# Patient Record
Sex: FEMALE | Race: WHITE | Employment: UNEMPLOYED | URBAN - METROPOLITAN AREA
[De-identification: names, ages, dates, MRNs, and addresses within clinical notes are randomized per-mention and may not be internally consistent; named-entity substitution may affect disease eponyms.]

---

## 2017-03-08 ENCOUNTER — OFFICE VISIT (OUTPATIENT)
Dept: OPHTHALMOLOGY | Facility: CLINIC | Age: 67
End: 2017-03-08
Attending: OPHTHALMOLOGY
Payer: COMMERCIAL

## 2017-03-08 DIAGNOSIS — H40.1132 PRIMARY OPEN ANGLE GLAUCOMA OF BOTH EYES, MODERATE STAGE: ICD-10-CM

## 2017-03-08 PROCEDURE — 99213 OFFICE O/P EST LOW 20 MIN: CPT | Mod: 25,ZF

## 2017-03-08 PROCEDURE — 92083 EXTENDED VISUAL FIELD XM: CPT | Mod: ZF | Performed by: OPHTHALMOLOGY

## 2017-03-08 PROCEDURE — 92133 CPTRZD OPH DX IMG PST SGM ON: CPT | Mod: ZF | Performed by: OPHTHALMOLOGY

## 2017-03-08 RX ORDER — CARBOXYMETHYLCELLULOSE SODIUM 5 MG/ML
1 SOLUTION/ DROPS OPHTHALMIC AT BEDTIME
COMMUNITY

## 2017-03-08 ASSESSMENT — CONF VISUAL FIELD
OD_INFERIOR_NASAL_RESTRICTION: 3
OS_NORMAL: 1

## 2017-03-08 ASSESSMENT — VISUAL ACUITY
OS_CC: 20/20
OS_CC+: -3
METHOD: SNELLEN - LINEAR
OD_CC+: +1
CORRECTION_TYPE: GLASSES
OD_CC: 20/25

## 2017-03-08 ASSESSMENT — REFRACTION_WEARINGRX
OD_SPHERE: -5.50
OS_SPHERE: -6.00
OD_ADD: +2.75
OS_AXIS: 084
OS_ADD: +2.75
OD_CYLINDER: +1.25
SPECS_TYPE: PAL
OS_CYLINDER: +2.00
OD_AXIS: 078

## 2017-03-08 ASSESSMENT — CUP TO DISC RATIO
OS_RATIO: 0.8
OD_RATIO: 0.85

## 2017-03-08 ASSESSMENT — TONOMETRY
OD_IOP_MMHG: 24
IOP_METHOD: APPLANATION
OS_IOP_MMHG: 07

## 2017-03-08 ASSESSMENT — SLIT LAMP EXAM - LIDS
COMMENTS: NORMAL
COMMENTS: NORMAL

## 2017-03-08 NOTE — MR AVS SNAPSHOT
After Visit Summary   3/8/2017    Anahy Villegas    MRN: [id number]           Patient Information     Date Of Birth          1950        Visit Information        Provider Department      3/8/2017 9:45 AM Annamarie Cade MD [facility]        Today's Diagnoses     Primary open angle glaucoma of both eyes, moderate stage           Follow-ups after your visit        Follow-up notes from your care team     Return in about 2 months (around 5/8/2017) for visual field 24-2 od only.      Your next 10 appointments already scheduled     May 17, 2017 12:00 PM CDT   VISUAL FIELD with [facility] EYE VISUAL FIELD   Eye Clinic ([facility])    [address]  [address]   [address] [address]  [facility] [id number] [phone]            May 17, 2017 12:30 PM CDT   RETURN GLAUCOMA with Annamarie Cade MD   Eye Clinic ([facility])    [address]  [address]   [address] [address] [place] [id number] [phone]              Who to contact     Please call your clinic at [phone] to:    Ask questions about your health    Make or cancel appointments    Discuss your medicines    Learn about your test results    Speak to your doctor   If you have compliments or concerns about an experience at your clinic, or if you wish to file a complaint, please contact [facility] Patient Relations at [phone] or email us at [email or web address]         Additional Information About Your Visit        MyChart Information     "[employer]hart gives you secure access to your electronic health record. If you see a primary care provider, you can also send messages to your care team and make appointments. If you have questions, please call your primary care clinic.  If you do not have a primary care provider, please call [phone] and they will assist you.      Andre Phillipe is an electronic gateway that provides easy, online access to your medical records. With  Freddy, you can request a clinic appointment, read your test results, renew a prescription or communicate with your care team.     To access your existing account, please contact your [facility] or call [phone] for assistance.        Care EveryWhere ID     This is your Care EveryWhere ID. This could be used by other organizations to access your [place] medical records  [id number]         Blood Pressure from Last 3 Encounters:   08/29/16 (!) 160/95   03/07/16 127/81   08/21/15 141/87    Weight from Last 3 Encounters:   08/29/16 83.9 kg (185 lb)   03/07/16 83 kg (183 lb)   08/21/15 82.6 kg (182 lb)              We Performed the Following     OCT Optic Nerve RNFL Spectralis OU (both eyes)     OVF 24-2 Dynamic OD          Today's Medication Changes          These changes are accurate as of: 3/8/17 12:00 PM.  If you have any questions, ask your nurse or doctor.               Stop taking these medicines if you haven't already. Please contact your care team if you have questions.     meloxicam 15 MG tablet   Commonly known as:  MOBIC           prednisoLONE acetate 1 % ophthalmic susp   Commonly known as:  PRED FORTE           priLOSEC OTC 20 MG tablet   Generic drug:  omeprazole                    Primary Care Provider Office Phone # Fax #    Gregg Chu [phone]       [address] [address]        Thank you!     Thank you for choosing EYE CLINIC  for your care. Our goal is always to provide you with excellent care. Hearing back from our patients is one way we can continue to improve our services. Please take a few minutes to complete the written survey that you may receive in the mail after your visit with us. Thank you!             Your Updated Medication List - Protect others around you: Learn how to safely use, store and throw away your medicines at www.disposemymeds.org.          This list is accurate as of:  3/8/17 12:00 PM.  Always use your most recent med list.                   Brand Name Dispense Instructions for use    albuterol 108 (90 BASE) MCG/ACT Inhaler    PROAIR HFA/PROVENTIL HFA/VENTOLIN HFA    1 Inhaler    Inhale 2 puffs into the lungs every 6 hours as needed for shortness of breath / dyspnea or wheezing       ALEVE 220 MG capsule   Generic drug:  naproxen sodium      Take 220 mg by mouth as needed       azithromycin 250 MG tablet    ZITHROMAX    6 tablet    Two tablets first day, then one tablet daily for four days.       carboxymethylcellulose 0.5 % Soln ophthalmic solution    REFRESH PLUS     Place 1 drop into both eyes At Bedtime       CENTRUM SILVER per tablet      Take 1 tablet by mouth daily.       cholecalciferol 1000 UNIT tablet    vitamin D     Take 1 tablet by mouth daily.       EPINEPHrine 0.3 MG/0.3ML injection     1 each    Inject 0.3 mLs (0.3 mg) into the muscle once as needed for anaphylaxis       esomeprazole 20 MG CR capsule    nexIUM     Take 20 mg by mouth every morning (before breakfast) Take 30-60 minutes before eating.       fish oil-omega-3 fatty acids 1000 MG capsule      Take 1 g by mouth daily.       hydrocortisone 2.5 % cream     30 g    Apply topically 2 times daily For 1-2 weeks       * Tafluprost 0.0015 % Soln    ZIOPTAN    30 each    Place 1 drop into the right eye At Bedtime       * Tafluprost 0.0015 % Soln     30 mL    Place 1 drop into the right eye At Bedtime       * Notice:  This list has 2 medication(s) that are the same as other medications prescribed for you. Read the directions carefully, and ask your doctor or other care provider to review them with you.

## 2017-03-08 NOTE — PROGRESS NOTES
Primary open angle glaucoma (POAG)  moderate stage in both eyes  Trabeculectomy mitomycin-C left eye 8/21/15    Not sure that right eye is stable    Family history: Positive    Current Ophthalmic Rx:  Zioptan at bedtime right eye   Intolerant to many drops (alphagan, azopt, non-preserved cosopt, lumigan)  Artificial tears as needed both eyes     Prior Ophthalmic Surgeries:  Selected laser trabeculoplasty (SLT) right eye done 7-14-14  Trabeculectomy mitomycin-C left eye 8/21/15    24-2 Visual field:  Nasal step right eye, has been variable in the past      Assessment:    Moderate Primary open angle glaucoma (POAG)  both eyes     Prefers aggressive treatment given family history   Prior OCT retinal nerve fiber layer continued downward trend, but no change for past year right eye, slight worsening left eye may have occurred before  trabeculectomy left eye    Visual fields show nasal step right eye, has been present past several years. IOP slightly above goal, on steroid inhaler past week and a half and previous  steroid response.    Trabeculectomy mitomycin-C left eye (8-21-15) doing well  No medications Left eye    Plan:   Return to clinic 2 months for repeat 24-2 right eye, if confirmed worse consider surgery  Previously intolerant of many drops, may need trabeculectomy right eye     Attending Physician Attestation:  Complete documentation of historical and exam elements from today's encounter can be found in the full encounter summary report (not reduplicated in this progress note). I personally obtained the chief complaint(s) and history of present illness. I confirmed and edited asnecessary the review of systems, past medical/surgical history, family history, social history, and examination findings as documented by others; and I examined the patient myself. I personally reviewed the relevant tests, images, and reports as documented above. I formulated and edited as necessary the assessment and plan and discussed  the findings and management plan with the patient and family.  - Annamarie Cade MD 11:32 AM 3/8/2017

## 2017-05-01 ENCOUNTER — HOSPITAL ENCOUNTER (OUTPATIENT)
Dept: PHYSICAL THERAPY | Facility: CLINIC | Age: 67
Setting detail: THERAPIES SERIES
End: 2017-05-01
Attending: ORTHOPAEDIC SURGERY
Payer: COMMERCIAL

## 2017-05-01 PROCEDURE — 97110 THERAPEUTIC EXERCISES: CPT | Mod: GP | Performed by: PHYSICAL THERAPIST

## 2017-05-01 PROCEDURE — 97140 MANUAL THERAPY 1/> REGIONS: CPT | Mod: GP | Performed by: PHYSICAL THERAPIST

## 2017-05-01 PROCEDURE — 97162 PT EVAL MOD COMPLEX 30 MIN: CPT | Mod: GP | Performed by: PHYSICAL THERAPIST

## 2017-05-01 PROCEDURE — 40000718 ZZHC STATISTIC PT DEPARTMENT ORTHO VISIT: Performed by: PHYSICAL THERAPIST

## 2017-05-03 NOTE — PROGRESS NOTES
05/01/17 1000   General Information   Type of Visit Initial OP Ortho PT Evaluation   Start of Care Date 05/01/17   Referring Physician Gregg Lopez MD - [facility]   Patient/Family Goals Statement goal is to leran HEP    Orders Evaluate and Treat   Date of Order 03/22/17   Insurance Type Other   Insurance Comments/Visits Authorized PO - 365   Medical Diagnosis L knee OA and low back pain    Surgical/Medical history reviewed Yes   Precautions/Limitations no known precautions/limitations   Body Part(s)   Body Part(s) Lumbar Spine/SI;Knee   Presentation and Etiology   Pertinent history of current problem (include personal factors and/or comorbidities that impact the POC) Pt reports initially having R knee weakness since a teen due to surgery thus the L knee has overworked. Pt relates L knee pain has increased in the last couple of months, possibly due to increased stairs/lifting at [employer]. Pt  had an injection in her L knee at the beginning of April which was helpful. Pt biggest c/o with knee is stairs and she also has possibly torn meniscus.  Pt also reports chronic LBP w a hx of injections. Pt reports she takes OTC meds. Pt reports she saw a chiro and she thought it was sciatica. LBP is the worst with sleeping and has a lot of pain over L SI joint. PMH: broken tail bone, L knee arthroscopy, arthritis,    Impairments A. Pain   Functional Limitations perform activities of daily living;perform required work activities;perform desired leisure / sports activities   Symptom Location L knee and LBP    How/Where did it occur From insidious onset   Onset date of current episode/exacerbation 04/03/17   Chronicity Other   Pain rating (0-10 point scale) Best (/10);Worst (/10)   Best (/10) 0   Worst (/10) 5   Pain quality C. Aching   Frequency of pain/symptoms B. Intermittent   Pain/symptoms exacerbated by C. Lifting;D. Carrying;M. Other   Pain/symptoms eased by D. Nothing   Progression of symptoms since onset:  Worsened   Current Level of Function   Current Community Support Family/friend caregiver   Patient role/employment history A. Employed   Employment Comments causal RN in [place]   Living environment [place]   Fall Risk Screen   Fall screen completed by PT   Per patient - Fall 2 or more times in past year? No   Per patient - Fall with injury in past year? No   Is patient a fall risk? No   Knee Objective Findings   Side (if bilateral, select both right and left) Left   Integumentary  min swelling   Posture squat -fair form  min pain on R    Lachmans Test pos    Anterior Drawer Test neg   Posterior Drawer Test neg   Varus Stress Test neg   Valgus Stress Test neg   Left Knee Extension AROM 0   Left Knee Flexion AROM 130   Lumbar Spine/SI Objective Findings   Balance/Proprioception (Single Leg Stance) R: 5 secs L: 5 secs w mod sway   Flexion ROM to floor    Extension %    Right Side Bending %   Left Side Bending % w pain on L    Pelvic Screen pos stork on L, R post innominate rot    Hip Flexion (L2) Strength R:4/5  L: 5/5     Hip Abduction Strength L: 3/5     Knee Flexion Strength 5/5   Knee Extension (L3) Strength R: 3/5, L: 5/5   Ankle Dorsiflexion (L4) Strength 5/5   Great Toe Extension (L5) Strength 5/5   Planned Therapy Interventions   Planned Therapy Interventions balance training;gait training;joint mobilization;manual therapy;ROM;neuromuscular re-education;strengthening;stretching   Planned Modality Interventions   Planned Modality Interventions Comments as needed   Clinical Impression   Criteria for Skilled Therapeutic Interventions Met yes, treatment indicated   PT Diagnosis L knee pain and LBP    Influenced by the following impairments decreased strength, ROM, pain   Functional limitations due to impairments balance, walking, stairs,    Clinical Presentation Evolving/Changing   Clinical Presentation Rationale +motivation - PMH, chornic pain, nature of job   Clinical Decision Making  (Complexity) Moderate complexity   Therapy Frequency 1 time/week   Predicted Duration of Therapy Intervention (days/wks) 8 weeks   Risk & Benefits of therapy have been explained Yes   Patient, Family & other staff in agreement with plan of care Yes   Education Assessment   Preferred Learning Style Reading;Listening;Demonstration;Pictures/video   Barriers to Learning No barriers   ORTHO GOALS   PT Ortho Eval Goals 1;2;3;4   Ortho Goal 1   Goal Identifier sleep   Goal Description Pt will report waking up less than 2x/night due to pain in order to get a restful night's sleep   Target Date 05/22/17   Ortho Goal 2   Goal Identifier ABDULLAHI   Goal Description Pt will report <10% disability on ABDULLAHI to demonstrate improved ability to complete daily activities and demonstrate significant clinical improvement   Target Date 06/12/17   Ortho Goal 3   Goal Identifier return to exercise class   Goal Description Pt will demonstrate 5/5 LE strength w less than 1/10 pain in order to allow her to return to exercise class   Target Date 05/22/17   Ortho Goal 4   Goal Identifier stairs   Goal Description Pt will demonstrates ascending/descending stair w reciprocal gait pattern and report less than 1/10 pain in order to return to PLOF.   Target Date 06/12/17   Total Evaluation Time   Total Evaluation Time 55(25 eval, 1 MT, 1 TE)      Andreina Cruz  [place] [address]  [email or web address]   [email or web address]   Office: [phone] Fax: [phone]

## 2017-05-08 ENCOUNTER — HOSPITAL ENCOUNTER (OUTPATIENT)
Dept: PHYSICAL THERAPY | Facility: CLINIC | Age: 67
Setting detail: THERAPIES SERIES
End: 2017-05-08
Attending: ORTHOPAEDIC SURGERY
Payer: COMMERCIAL

## 2017-05-08 PROCEDURE — 97110 THERAPEUTIC EXERCISES: CPT | Mod: GP | Performed by: PHYSICAL THERAPIST

## 2017-05-08 PROCEDURE — 40000718 ZZHC STATISTIC PT DEPARTMENT ORTHO VISIT: Performed by: PHYSICAL THERAPIST

## 2017-05-15 ENCOUNTER — HOSPITAL ENCOUNTER (OUTPATIENT)
Dept: PHYSICAL THERAPY | Facility: CLINIC | Age: 67
Setting detail: THERAPIES SERIES
End: 2017-05-15
Attending: ORTHOPAEDIC SURGERY
Payer: COMMERCIAL

## 2017-05-15 PROCEDURE — 40000718 ZZHC STATISTIC PT DEPARTMENT ORTHO VISIT: Performed by: PHYSICAL THERAPIST

## 2017-05-15 PROCEDURE — 97140 MANUAL THERAPY 1/> REGIONS: CPT | Mod: GP | Performed by: PHYSICAL THERAPIST

## 2017-05-15 PROCEDURE — 97110 THERAPEUTIC EXERCISES: CPT | Mod: GP | Performed by: PHYSICAL THERAPIST

## 2017-05-17 ENCOUNTER — APPOINTMENT (OUTPATIENT)
Dept: OPHTHALMOLOGY | Facility: CLINIC | Age: 67
End: 2017-05-17
Attending: OPHTHALMOLOGY
Payer: COMMERCIAL

## 2017-05-17 DIAGNOSIS — H40.1132 PRIMARY OPEN ANGLE GLAUCOMA OF BOTH EYES, MODERATE STAGE: Primary | ICD-10-CM

## 2017-05-17 PROCEDURE — 99213 OFFICE O/P EST LOW 20 MIN: CPT | Mod: 25

## 2017-05-17 PROCEDURE — 92083 EXTENDED VISUAL FIELD XM: CPT | Mod: ZF | Performed by: OPHTHALMOLOGY

## 2017-05-17 ASSESSMENT — CUP TO DISC RATIO
OD_RATIO: 0.85
OS_RATIO: 0.8

## 2017-05-17 ASSESSMENT — VISUAL ACUITY
OD_CC: 20/20
METHOD: SNELLEN - LINEAR
CORRECTION_TYPE: GLASSES
OD_CC+: -1
OS_CC: 20/30

## 2017-05-17 ASSESSMENT — TONOMETRY
OD_IOP_MMHG: 27
IOP_METHOD: APPLANATION
IOP_METHOD: APPLANATION
OD_IOP_MMHG: 23
OS_IOP_MMHG: 08
IOP_METHOD: APPLANATION
OD_IOP_MMHG: 27

## 2017-05-17 ASSESSMENT — SLIT LAMP EXAM - LIDS
COMMENTS: NORMAL
COMMENTS: NORMAL

## 2017-05-17 ASSESSMENT — CONF VISUAL FIELD
OS_NORMAL: 1
OD_NORMAL: 1

## 2017-05-17 NOTE — MR AVS SNAPSHOT
After Visit Summary   5/17/2017    Anahy Villegas    MRN: [id number]           Patient Information     Date Of Birth          1950        Visit Information        Provider Department      5/17/2017 12:30 PM Annamarie Cade MD [facility]        Today's Diagnoses     Primary open angle glaucoma of both eyes, moderate stage    -  1       Follow-ups after your visit        Your next 10 appointments already scheduled     Jun 05, 2017 11:00 AM CDT   Treatment with Andreina Cruz, PT   [facility] ([facility])    [address] [place] [address]   [phone]            Aug 30, 2017 10:00 AM CDT   Post-Op with Annamarie Cade MD   Eye Clinic ([facility])    [address]  [address]  [address] [address]a  [facility] [address]   [phone]            Sep 06, 2017 12:15 PM CDT   Post-Op with Annamarie Cade MD   Eye Clinic ([facility])    [address]  [address]   [address] [address]  [facility] [address]   [phone]            Sep 20, 2017 12:30 PM CDT   Post-Op with Annamarie Cade MD   Eye Clinic ([facility])    [address]  [address]  [address] Clin [address]a  [facility] [address]   [phone]            Oct 04, 2017 12:30 PM CDT   Post-Op with Annamarie Cade MD   Eye Clinic ([facility])    [address]  [address]   [address] [address]  [facility] [address]   [phone]              Who to contact     Please call your clinic at [phone] to:    Ask questions about your health    Make or cancel appointments    Discuss your medicines    Learn about your test results    Speak to your doctor   If you have compliments or concerns about an experience at your clinic, or if you wish to file a complaint, please contact [facility] Patient Relations at [phone] or email us at [email or web address]          Additional Information About Your Visit        [employer] Information     [employer] gives you secure access to your electronic health record. If you see a primary care provider, you can also send messages to your care team and make appointments. If you have questions, please call your primary care clinic.  If you do not have a primary care provider, please call [phone] and they will assist you.      [employer] is an electronic gateway that provides easy, online access to your medical records. With [employer], you can request a clinic appointment, read your test results, renew a prescription or communicate with your care team.     To access your existing account, please contact your [facility] Clinic or call [phone] for assistance.        Care EveryWhere ID     This is your Care EveryWhere ID. This could be used by other organizations to access your [place] medical records  [id number]         Blood Pressure from Last 3 Encounters:   08/29/16 (!) 160/95   03/07/16 127/81   08/21/15 141/87    Weight from Last 3 Encounters:   08/29/16 83.9 kg (185 lb)   03/07/16 83 kg (183 lb)   08/21/15 82.6 kg (182 lb)              We Performed the Following     OVF 24-2 Dynamic OD     Christal-Operative Worksheet (Glaucoma)        Primary Care Provider Office Phone # Fax #    Gregg Chu [phone]       [facility] [address] [address]        Thank you!     Thank you for choosing EYE CLINIC  for your care. Our goal is always to provide you with excellent care. Hearing back from our patients is one way we can continue to improve our services. Please take a few minutes to complete the written survey that you may receive in the mail after your visit with us. Thank you!             Your Updated Medication List - Protect others around you: Learn how to safely use, store and throw away your medicines at www.disposemymeds.org.          This list is accurate as  of: 5/17/17  1:55 PM.  Always use your most recent med list.                   Brand Name Dispense Instructions for use    albuterol 108 (90 BASE) MCG/ACT Inhaler    PROAIR HFA/PROVENTIL HFA/VENTOLIN HFA    1 Inhaler    Inhale 2 puffs into the lungs every 6 hours as needed for shortness of breath / dyspnea or wheezing       ALEVE 220 MG capsule   Generic drug:  naproxen sodium      Take 220 mg by mouth as needed       azithromycin 250 MG tablet    ZITHROMAX    6 tablet    Two tablets first day, then one tablet daily for four days.       carboxymethylcellulose 0.5 % Soln ophthalmic solution    REFRESH PLUS     Place 1 drop into both eyes At Bedtime       CENTRUM SILVER per tablet      Take 1 tablet by mouth daily.       cholecalciferol 1000 UNIT tablet    vitamin D     Take 1 tablet by mouth daily.       EPINEPHrine 0.3 MG/0.3ML injection     1 each    Inject 0.3 mLs (0.3 mg) into the muscle once as needed for anaphylaxis       esomeprazole 20 MG CR capsule    nexIUM     Take 20 mg by mouth every morning (before breakfast) Take 30-60 minutes before eating.       fish oil-omega-3 fatty acids 1000 MG capsule      Take 1 g by mouth daily.       hydrocortisone 2.5 % cream     30 g    Apply topically 2 times daily For 1-2 weeks       * Tafluprost 0.0015 % Soln    ZIOPTAN    30 each    Place 1 drop into the right eye At Bedtime       * Tafluprost 0.0015 % Soln     30 mL    Place 1 drop into the right eye At Bedtime       * Notice:  This list has 2 medication(s) that are the same as other medications prescribed for you. Read the directions carefully, and ask your doctor or other care provider to review them with you.

## 2017-05-17 NOTE — PROGRESS NOTES
Primary open angle glaucoma (POAG)  moderate stage in both eyes  Trabeculectomy mitomycin-C left eye 8/21/15    Seen 2 months ago. Here for repeat visual field.     Family history: Positive    Current Ophthalmic Rx:  Zioptan at bedtime right eye   Intolerant to many drops (alphagan, azopt, non-preserved cosopt, lumigan)  Artificial tears as needed right eye only      Prior Ophthalmic Surgeries:  Selected laser trabeculoplasty (SLT) right eye done 7-14-14  Trabeculectomy mitomycin-C left eye 8/21/15    24-2 Visual field:  Nasal step right eye, has been variable in the past      Assessment:    Moderate Primary open angle glaucoma (POAG)  both eyes     Prefers aggressive treatment given family history   Prior OCT retinal nerve fiber layer continued downward trend, but no change for past year right eye    slight worsening left eye may have occurred before trabeculectomy left eye    Visual fields show nasal step right eye, has been present past several years, appears possibly worse roday     Trabeculectomy mitomycin-C left eye (8-21-15) doing well   Used 1.5 minutes of 0.2 mg.cc mitomycin-C and 3 sutures   No medications Left eye    Plan:   VF appears worse today right eye, has been variable  Trabeculectomy mitomycin-C right eye    Out patient   Block   Risks discussed  Prefers August/September    Attending Physician Attestation:  Complete documentation of historical and exam elements from today's encounter can be found in the full encounter summary report (not reduplicated in this progress note). I personally obtained the chief complaint(s) and history of present illness. I confirmed and edited asnecessary the review of systems, past medical/surgical history, family history, social history, and examination findings as documented by others; and I examined the patient myself. I personally reviewed the relevant tests, images, and reports as documented above. I formulated and edited as necessary the assessment and plan and  discussed the findings and management plan with the patient and family.  - Annamarie Cade MD 1:16 PM 5/17/2017

## 2017-05-22 ENCOUNTER — RECORDS - HEALTHEAST (OUTPATIENT)
Dept: ADMINISTRATIVE | Facility: OTHER | Age: 67
End: 2017-05-22

## 2017-06-05 ENCOUNTER — HOSPITAL ENCOUNTER (OUTPATIENT)
Dept: PHYSICAL THERAPY | Facility: CLINIC | Age: 67
Setting detail: THERAPIES SERIES
End: 2017-06-05
Attending: ORTHOPAEDIC SURGERY
Payer: COMMERCIAL

## 2017-06-05 PROCEDURE — 97140 MANUAL THERAPY 1/> REGIONS: CPT | Mod: GP | Performed by: PHYSICAL THERAPIST

## 2017-06-05 PROCEDURE — 40000718 ZZHC STATISTIC PT DEPARTMENT ORTHO VISIT: Performed by: PHYSICAL THERAPIST

## 2017-06-05 PROCEDURE — 97110 THERAPEUTIC EXERCISES: CPT | Mod: GP | Performed by: PHYSICAL THERAPIST

## 2017-08-02 ENCOUNTER — OFFICE VISIT - HEALTHEAST (OUTPATIENT)
Dept: INTERNAL MEDICINE | Facility: CLINIC | Age: 67
End: 2017-08-02

## 2017-08-02 DIAGNOSIS — B37.9 CANDIDIASIS: ICD-10-CM

## 2017-08-02 DIAGNOSIS — Z01.818 PREOP EXAM FOR INTERNAL MEDICINE: ICD-10-CM

## 2017-08-02 DIAGNOSIS — H40.9 GLAUCOMA: ICD-10-CM

## 2017-08-02 ASSESSMENT — MIFFLIN-ST. JEOR: SCORE: 1371.17

## 2017-08-29 ENCOUNTER — TRANSFERRED RECORDS (OUTPATIENT)
Dept: HEALTH INFORMATION MANAGEMENT | Facility: CLINIC | Age: 67
End: 2017-08-29

## 2017-08-30 ENCOUNTER — OFFICE VISIT (OUTPATIENT)
Dept: OPHTHALMOLOGY | Facility: CLINIC | Age: 67
End: 2017-08-30
Attending: OPHTHALMOLOGY
Payer: MEDICARE

## 2017-08-30 DIAGNOSIS — Z48.810 AFTERCARE FOLLOWING SURGERY OF A SENSORY ORGAN: Primary | ICD-10-CM

## 2017-08-30 PROCEDURE — 99213 OFFICE O/P EST LOW 20 MIN: CPT | Mod: ZF

## 2017-08-30 ASSESSMENT — CUP TO DISC RATIO
OD_RATIO: 0.85
OS_RATIO: 0.8

## 2017-08-30 ASSESSMENT — VISUAL ACUITY
OS_CC: 20/30
OD_CC+: -1
CORRECTION_TYPE: GLASSES
METHOD: SNELLEN - LINEAR
OD_CC: 20/30

## 2017-08-30 ASSESSMENT — SLIT LAMP EXAM - LIDS
COMMENTS: NORMAL
COMMENTS: NORMAL

## 2017-08-30 ASSESSMENT — TONOMETRY
OD_IOP_MMHG: 10
OS_IOP_MMHG: 10
IOP_METHOD: APPLANATION
IOP_METHOD: APPLANATION
OS_IOP_MMHG: 11
OD_IOP_MMHG: 09

## 2017-08-30 NOTE — PROGRESS NOTES
Primary open angle glaucoma (POAG)  moderate stage in both eyes  Postoperative day #1 s/p Trabeculectomy mitomycin-C right eye 8/30/17  Trabeculectomy mitomycin-C left eye 8/21/15    Vision stable, mildly blurry in both eyes. Eyes comfortable.     Family history: Positive    Current Ophthalmic Rx:  Zioptan at bedtime right eye (stopped after surgery)  Intolerant to many drops (alphagan, azopt, non-preserved cosopt, lumigan)  Artificial tears as needed right eye only      Prior Ophthalmic Surgeries:  Selected laser trabeculoplasty (SLT) right eye done 7-14-14  Trabeculectomy mitomycin-C left eye 8/21/15  Trabeculectomy mitomycin-C right eye 8/30/17    Assessment:  Moderate Primary open angle glaucoma (POAG)  both eyes     Prefers aggressive treatment given family history   Prior OCT retinal nerve fiber layer continued downward trend, but no change for past year right eye    slight worsening left eye may have occurred before trabeculectomy left eye    Visual fields show nasal step right eye, has been present past several years, appears possibly worse     Postoperative day #1 s/p Trabeculectomy mitomycin-C right eye 8/30/17   Used 1 minute of 0.4mg/cc mitomycin-C and 3 sutures    Trabeculectomy mitomycin-C left eye (8-21-15) doing well   Used 1.5 minutes of 0.2 mg.cc mitomycin-C and 3 sutures   No medications Left eye    Plan:   Postoperative day #1 s/p Trabeculectomy mitomycin-C right eye 8/30/17   IOP 09     Postoperative instructions and sheet given including no bending, no lifting more than 10 pounds    Use prednisolone acetate 1% every 2 hours while awake for 2 weeks, then every 3 hours while awake for 2 weeks, then four times a day for 1 week, three times a day for 1 week, twice a day for 1 week, once a day for 1 week, then stop.    F/u as scheduled 9/6/17, sooner prn     Attending Physician Attestation:  Complete documentation of historical and exam elements from today's encounter can be found in the full  encounter summary report (not reduplicated in this progress note). I personally obtained the chief complaint(s) and history of present illness. I confirmed and edited asnecessary the review of systems, past medical/surgical history, family history, social history, and examination findings as documented by others; and I examined the patient myself. I personally reviewed the relevant tests, images, and reports as documented above. I formulated and edited as necessary the assessment and plan and discussed the findings and management plan with the patient and family.  - Annamarie Cade MD 11:37 AM 8/30/2017

## 2017-08-30 NOTE — NURSING NOTE
Chief Complaints and History of Present Illnesses   Patient presents with     Follow Up For     Trab RE 08/29/2017     HPI    Affected eye(s):  Right   Symptoms:     Blurred vision   Decreased vision         Do you have eye pain now?:  No      Comments:  Batsheva Aj COA 10:37 AM August 30, 2017

## 2017-08-30 NOTE — MR AVS SNAPSHOT
After Visit Summary   8/30/2017    Anahy Villegas    MRN: [id number]           Patient Information     Date Of Birth          1950        Visit Information        Provider Department      8/30/2017 10:00 AM Annamarie Cade MD [facility]        Today's Diagnoses     Aftercare following surgery of a sensory organ    -  1       Follow-ups after your visit        Your next 10 appointments already scheduled     Sep 06, 2017 12:15 PM CDT   Post-Op with Annamarie Cade MD   Eye Clinic ([facility])    [address]  [address]   [address] [address]  [facility] [address]   [phone]            Sep 20, 2017 12:30 PM CDT   Post-Op with Annamarie Cade MD   Eye Clinic ([facility])    [address]  [address] [facility]  [address] [address]  [facility] [address]   [phone]            Oct 04, 2017 12:30 PM CDT   Post-Op with Annamarie Cade MD   Eye Clinic ([facility])    [address]  [address] [facility]  [address] [address]  [facility] [address]   [phone]              Who to contact     Please call your clinic at [phone] to:    Ask questions about your health    Make or cancel appointments    Discuss your medicines    Learn about your test results    Speak to your doctor   If you have compliments or concerns about an experience at your clinic, or if you wish to file a complaint, please contact [facility] Patient Relations at [phone] or email us at [email or web address]         Additional Information About Your Visit        MyChart Information     [employer] gives you secure access to your electronic health record. If you see a primary care provider, you can also send messages to your care team and make appointments. If you have questions, please call your primary care clinic.  If you do not have a primary care provider, please call [phone] and they will assist you.      [employer] is an electronic  gateway that provides easy, online access to your medical records. With [email or web address], you can request a clinic appointment, read your test results, renew a prescription or communicate with your care team.     To access your existing account, please contact your [facility] or call [phone] for assistance.        Care EveryWhere ID     This is your Care EveryWhere ID. This could be used by other organizations to access your [place] medical records  [id number]         Blood Pressure from Last 3 Encounters:   08/29/16 (!) 160/95   03/07/16 127/81   08/21/15 141/87    Weight from Last 3 Encounters:   08/29/16 83.9 kg (185 lb)   03/07/16 83 kg (183 lb)   08/21/15 82.6 kg (182 lb)              Today, you had the following     No orders found for display       Primary Care Provider Office Phone # Fax #    Gregg Chu [phone]       [facility] [address] [address]        Equal Access to Services     VALERI TRIANA : Hadii aad ku hadasho Soomaali, waaxda luqadaha, qaybta kaalmada adeegyada, waxay jomarin hayphilip rolon . So [facility] [phone].    ATENCIÓN: Si habla español, tiene a ledbetter disposición servicios gratuitos de asistencia lingüística. LlPremier [facility] [phone].    We comply with applicable federal civil rights laws and [place] laws. We do not discriminate on the basis of race, color, national origin, age, disability sex, sexual orientation or gender identity.            Thank you!     Thank you for choosing EYE CLINIC  for your care. Our goal is always to provide you with excellent care. Hearing back from our patients is one way we can continue to improve our services. Please take a few minutes to complete the written survey that you may receive in the mail after your visit with us. Thank you!             Your Updated Medication List - Protect others around you: Learn how to safely use, store and throw away your  medicines at www.disposemymeds.org.          This list is accurate as of: 8/30/17 11:38 AM.  Always use your most recent med list.                   Brand Name Dispense Instructions for use Diagnosis    albuterol 108 (90 BASE) MCG/ACT Inhaler    PROAIR HFA/PROVENTIL HFA/VENTOLIN HFA    1 Inhaler    Inhale 2 puffs into the lungs every 6 hours as needed for shortness of breath / dyspnea or wheezing    Cough, Upper respiratory tract infection, unspecified type       ALEVE 220 MG capsule   Generic drug:  naproxen sodium      Take 220 mg by mouth as needed        azithromycin 250 MG tablet    ZITHROMAX    6 tablet    Two tablets first day, then one tablet daily for four days.    Cough       carboxymethylcellulose 0.5 % Soln ophthalmic solution    REFRESH PLUS     Place 1 drop into both eyes At Bedtime        CENTRUM SILVER per tablet      Take 1 tablet by mouth daily.        cholecalciferol 1000 UNIT tablet    vitamin D     Take 1 tablet by mouth daily.        EPINEPHrine 0.3 MG/0.3ML injection 2-pack    EPIPEN/ADRENACLICK/or ANY BX GENERIC EQUIV    1 each    Inject 0.3 mLs (0.3 mg) into the muscle once as needed for anaphylaxis    Wasp sting       esomeprazole 20 MG CR capsule    nexIUM     Take 20 mg by mouth every morning (before breakfast) Take 30-60 minutes before eating.        fish oil-omega-3 fatty acids 1000 MG capsule      Take 1 g by mouth daily.        hydrocortisone 2.5 % cream     30 g    Apply topically 2 times daily For 1-2 weeks    Rash       * Tafluprost 0.0015 % Soln    ZIOPTAN    30 each    Place 1 drop into the right eye At Bedtime    Glaucoma       * Tafluprost 0.0015 % Soln     30 mL    Place 1 drop into the right eye At Bedtime    Primary open angle glaucoma of right eye, moderate stage       * Notice:  This list has 2 medication(s) that are the same as other medications prescribed for you. Read the directions carefully, and ask your doctor or other care provider to review them with you.

## 2017-09-06 ENCOUNTER — OFFICE VISIT (OUTPATIENT)
Dept: OPHTHALMOLOGY | Facility: CLINIC | Age: 67
End: 2017-09-06
Attending: OPHTHALMOLOGY
Payer: MEDICARE

## 2017-09-06 DIAGNOSIS — Z48.810 AFTERCARE FOLLOWING SURGERY OF A SENSORY ORGAN: Primary | ICD-10-CM

## 2017-09-06 PROCEDURE — 99212 OFFICE O/P EST SF 10 MIN: CPT | Mod: ZF

## 2017-09-06 RX ORDER — PREDNISOLONE ACETATE 10 MG/ML
1 SUSPENSION/ DROPS OPHTHALMIC
Qty: 15 ML | Refills: 3 | Status: SHIPPED | OUTPATIENT
Start: 2017-09-06 | End: 2018-03-05

## 2017-09-06 ASSESSMENT — SLIT LAMP EXAM - LIDS
COMMENTS: NORMAL
COMMENTS: NORMAL

## 2017-09-06 ASSESSMENT — REFRACTION_WEARINGRX
OD_SPHERE: -5.50
OD_AXIS: 078
OS_AXIS: 084
OD_CYLINDER: +1.25
OS_SPHERE: -6.00
SPECS_TYPE: PAL
OD_ADD: +2.75
OS_CYLINDER: +2.00
OS_ADD: +2.75

## 2017-09-06 ASSESSMENT — VISUAL ACUITY
METHOD: SNELLEN - LINEAR
CORRECTION_TYPE: GLASSES
OD_CC: 20/25
OS_CC: 20/20
OD_CC+: -2

## 2017-09-06 ASSESSMENT — TONOMETRY
OD_IOP_MMHG: 05
OS_IOP_MMHG: 11
IOP_METHOD: APPLANATION

## 2017-09-06 ASSESSMENT — CUP TO DISC RATIO
OD_RATIO: 0.85
OS_RATIO: 0.8

## 2017-09-06 NOTE — NURSING NOTE
Chief Complaints and History of Present Illnesses   Patient presents with     Post Op (Ophthalmology) Right Eye     s/p Trabeculectomy mitomycin-C right eye 8/30/17     HPI    Affected eye(s):  Right   Symptoms:        Duration:  1 week   Frequency:  Constant       Do you have eye pain now?:  No      Comments:  Pt. States that there has been no change in VA BE.  No c/o comfort BE.  Nadege Castorena COT 12:16 PM September 6, 2017

## 2017-09-06 NOTE — MR AVS SNAPSHOT
After Visit Summary   9/6/2017    Anahy Villegas    MRN: [id number]           Patient Information     Date Of Birth          1950        Visit Information        Provider Department      9/6/2017 12:15 PM Annamarie Cade MD [facility]        Today's Diagnoses     Aftercare following surgery of a sensory organ    -  1       Follow-ups after your visit        Follow-up notes from your care team     Return in about 2 weeks (around 9/20/2017).      Your next 10 appointments already scheduled     Sep 20, 2017 12:30 PM CDT   Post-Op with Annamarie Cade MD   Eye Clinic ([facility])    [address]  [address]  [facility] [address]   [phone]            Oct 04, 2017 12:30 PM CDT   Post-Op with Annamarie Cade MD   Eye Clinic ([facility])    [address]  [address] [facility]  [address] Clin 9a  [facility] [address]   [phone]              Who to contact     Please call your clinic at [phone] to:    Ask questions about your health    Make or cancel appointments    Discuss your medicines    Learn about your test results    Speak to your doctor   If you have compliments or concerns about an experience at your clinic, or if you wish to file a complaint, please contact [facility] Patient Relations at [phone] or email us at [email or web address]         Additional Information About Your Visit        MyChart Information     [employer] gives you secure access to your electronic health record. If you see a primary care provider, you can also send messages to your care team and make appointments. If you have questions, please call your primary care clinic.  If you do not have a primary care provider, please call [phone] and they will assist you.      [employer] is an electronic gateway that provides easy, online access to your medical records. With [employer], you can request a clinic appointment,  read your test results, renew a prescription or communicate with your care team.     To access your existing account, please contact your [facility] or call [phone] for assistance.        Care EveryWhere ID     This is your Care EveryWhere ID. This could be used by other organizations to access your [place] medical records  [id number]         Blood Pressure from Last 3 Encounters:   08/29/16 (!) 160/95   03/07/16 127/81   08/21/15 141/87    Weight from Last 3 Encounters:   08/29/16 83.9 kg (185 lb)   03/07/16 83 kg (183 lb)   08/21/15 82.6 kg (182 lb)              Today, you had the following     No orders found for display         Today's Medication Changes          These changes are accurate as of: 9/6/17 12:34 PM.  If you have any questions, ask your nurse or doctor.               Start taking these medicines.        Dose/Directions    prednisoLONE acetate 1 % ophthalmic susp   Commonly known as:  PRED FORTE   Used for:  Aftercare following surgery of a sensory organ   Started by:  Annamarie Cade MD        Dose:  1 drop   Place 1 drop into the right eye every 2 hours   Quantity:  15 mL   Refills:  3            Where to get your medicines      These medications were sent to [facility] [address] [place] [address] AT [address]  [address] [facility] [address]     Phone:  [phone]     prednisoLONE acetate 1 % ophthalmic susp                Primary Care Provider Office Phone # Fax #    Gregg Chu [phone]       [facility] [address] [facility] [address]  [facility] [address]        Equal Access to Services     KENNY TRIANA : Hadii veronica thomas Soaltagracia, waaxda luqadaha, qaybta kaalmadiogenes moser. So [facility] [phone].    ATENCIÓN: Si habla español, tiene a ledbetter disposición servicios gratuitos de asistencia lingüística. Llame al  [phone].    We comply with applicable federal civil rights laws and [place] laws. We do not discriminate on the basis of race, color, national origin, age, disability sex, sexual orientation or gender identity.            Thank you!     Thank you for choosing EYE CLINIC  for your care. Our goal is always to provide you with excellent care. Hearing back from our patients is one way we can continue to improve our services. Please take a few minutes to complete the written survey that you may receive in the mail after your visit with us. Thank you!             Your Updated Medication List - Protect others around you: Learn how to safely use, store and throw away your medicines at www.disposemymeds.org.          This list is accurate as of: 9/6/17 12:34 PM.  Always use your most recent med list.                   Brand Name Dispense Instructions for use Diagnosis    albuterol 108 (90 BASE) MCG/ACT Inhaler    PROAIR HFA/PROVENTIL HFA/VENTOLIN HFA    1 Inhaler    Inhale 2 puffs into the lungs every 6 hours as needed for shortness of breath / dyspnea or wheezing    Cough, Upper respiratory tract infection, unspecified type       ALEVE 220 MG capsule   Generic drug:  naproxen sodium      Take 220 mg by mouth as needed        azithromycin 250 MG tablet    ZITHROMAX    6 tablet    Two tablets first day, then one tablet daily for four days.    Cough       carboxymethylcellulose 0.5 % Soln ophthalmic solution    REFRESH PLUS     Place 1 drop into both eyes At Bedtime        CENTRUM SILVER per tablet      Take 1 tablet by mouth daily.        cholecalciferol 1000 UNIT tablet    vitamin D     Take 1 tablet by mouth daily.        EPINEPHrine 0.3 MG/0.3ML injection 2-pack    EPIPEN/ADRENACLICK/or ANY BX GENERIC EQUIV    1 each    Inject 0.3 mLs (0.3 mg) into the muscle once as needed for anaphylaxis    Wasp sting       esomeprazole 20 MG CR capsule    nexIUM     Take 20 mg by mouth every morning (before breakfast) Take 30-60  minutes before eating.        fish oil-omega-3 fatty acids 1000 MG capsule      Take 1 g by mouth daily.        hydrocortisone 2.5 % cream     30 g    Apply topically 2 times daily For 1-2 weeks    Rash       prednisoLONE acetate 1 % ophthalmic susp    PRED FORTE    15 mL    Place 1 drop into the right eye every 2 hours    Aftercare following surgery of a sensory organ       * Tafluprost 0.0015 % Soln    ZIOPTAN    30 each    Place 1 drop into the right eye At Bedtime    Glaucoma       * Tafluprost 0.0015 % Soln     30 mL    Place 1 drop into the right eye At Bedtime    Primary open angle glaucoma of right eye, moderate stage       * Notice:  This list has 2 medication(s) that are the same as other medications prescribed for you. Read the directions carefully, and ask your doctor or other care provider to review them with you.

## 2017-09-06 NOTE — PROGRESS NOTES
Primary open angle glaucoma (POAG)  moderate stage in both eyes  Postoperative week #1 s/p Trabeculectomy mitomycin-C right eye 8/30/17  Trabeculectomy mitomycin-C left eye 8/21/15    Vision stable, mildly blurry in both eyes. Eyes comfortable.     Family history: Positive    Current Ophthalmic Rx:  Zioptan at bedtime right eye (stopped after surgery)  Intolerant to many drops (alphagan, azopt, non-preserved cosopt, lumigan)  Artificial tears as needed right eye only      Prior Ophthalmic Surgeries:  Selected laser trabeculoplasty (SLT) right eye done 7-14-14  Trabeculectomy mitomycin-C left eye 8/21/15  Trabeculectomy mitomycin-C right eye 8/30/17    Assessment:  Moderate Primary open angle glaucoma (POAG)  both eyes     Prefers aggressive treatment given family history   Prior OCT retinal nerve fiber layer continued downward trend, but no change for past year right eye    slight worsening left eye may have occurred before trabeculectomy left eye    Visual fields show nasal step right eye, has been present past several years, appears possibly worse     Postoperative day #1 s/p Trabeculectomy mitomycin-C right eye 8/30/17   Used 1 minute of 0.4mg/cc mitomycin-C and 3 sutures    Trabeculectomy mitomycin-C left eye (8-21-15) doing well   Used 1.5 minutes of 0.2 mg.cc mitomycin-C and 3 sutures   No medications Left eye    Plan:   Trabeculectomy mitomycin-C right eye 8/30/17   IOP 05    Use prednisolone acetate 1% every 2 hours while awake for 2 weeks, then every 3 hours while awake for 2 weeks, then four times a day for 1 week, three times a day for 1 week, twice a day for 1 week, once a day for 1 week, then stop.    Return to clinic 2 weeks    Attending Physician Attestation:  Complete documentation of historical and exam elements from today's encounter can be found in the full encounter summary report (not reduplicated in this progress note). I personally obtained the chief complaint(s) and history of present  illness. I confirmed and edited asnecessary the review of systems, past medical/surgical history, family history, social history, and examination findings as documented by others; and I examined the patient myself. I personally reviewed the relevant tests, images, and reports as documented above. I formulated and edited as necessary the assessment and plan and discussed the findings and management plan with the patient and family.  - Annamarie Cade MD 12:26 PM 9/6/2017

## 2017-09-20 ENCOUNTER — OFFICE VISIT (OUTPATIENT)
Dept: OPHTHALMOLOGY | Facility: CLINIC | Age: 67
End: 2017-09-20
Attending: OPHTHALMOLOGY
Payer: MEDICARE

## 2017-09-20 DIAGNOSIS — Z48.810 AFTERCARE FOLLOWING SURGERY OF A SENSORY ORGAN: Primary | ICD-10-CM

## 2017-09-20 PROCEDURE — 99212 OFFICE O/P EST SF 10 MIN: CPT | Mod: ZF

## 2017-09-20 ASSESSMENT — SLIT LAMP EXAM - LIDS
COMMENTS: NORMAL
COMMENTS: NORMAL

## 2017-09-20 ASSESSMENT — CUP TO DISC RATIO
OD_RATIO: 0.85
OS_RATIO: 0.8

## 2017-09-20 ASSESSMENT — REFRACTION_WEARINGRX
OS_AXIS: 084
OD_AXIS: 078
SPECS_TYPE: PAL
OS_ADD: +2.75
OD_SPHERE: -5.50
OD_CYLINDER: +1.25
OS_CYLINDER: +2.00
OD_ADD: +2.75
OS_SPHERE: -6.00

## 2017-09-20 ASSESSMENT — VISUAL ACUITY
OD_CC: 20/20
METHOD: SNELLEN - LINEAR
OS_CC+: -2
CORRECTION_TYPE: GLASSES
OD_CC+: -2
OS_CC: 20/20

## 2017-09-20 ASSESSMENT — TONOMETRY
OS_IOP_MMHG: 06
OD_IOP_MMHG: 06
IOP_METHOD: APPLANATION

## 2017-09-20 NOTE — MR AVS SNAPSHOT
After Visit Summary   9/20/2017    Anahy Villegas    MRN: [id number]           Patient Information     Date Of Birth          1950        Visit Information        Provider Department      9/20/2017 12:30 PM Annamarie Cade MD [facility]        Today's Diagnoses     Aftercare following surgery of a sensory organ    -  1       Follow-ups after your visit        Your next 10 appointments already scheduled     Oct 04, 2017 12:30 PM CDT   Post-Op with Annamarie Cade MD   Eye Clinic ([facility])    [address]  [address] [facility]  9th Fl Clin 9a  [facility] [address]   [phone]              Who to contact     Please call your clinic at [phone] to:    Ask questions about your health    Make or cancel appointments    Discuss your medicines    Learn about your test results    Speak to your doctor   If you have compliments or concerns about an experience at your clinic, or if you wish to file a complaint, please contact [facility] Patient Relations at [phone] or email us at [email or web address]         Additional Information About Your Visit        MyChart Information     [employer] gives you secure access to your electronic health record. If you see a primary care provider, you can also send messages to your care team and make appointments. If you have questions, please call your primary care clinic.  If you do not have a primary care provider, please call [phone] and they will assist you.      [employer] is an electronic gateway that provides easy, online access to your medical records. With [employer], you can request a clinic appointment, read your test results, renew a prescription or communicate with your care team.     To access your existing account, please contact your [facility] or call [phone] for assistance.        Care EveryWhere ID     This is your Care EveryWhere ID. This could be  used by other organizations to access your [place] medical records  [id number]         Blood Pressure from Last 3 Encounters:   08/29/16 (!) 160/95   03/07/16 127/81   08/21/15 141/87    Weight from Last 3 Encounters:   08/29/16 83.9 kg (185 lb)   03/07/16 83 kg (183 lb)   08/21/15 82.6 kg (182 lb)              Today, you had the following     No orders found for display       Primary Care Provider Office Phone # Fax #    Gregg Chu [phone]       [facility] [address] [facility] [address]  [facility] [address]        Equal Access to Services     [facility] HospitalDELONTE : Hadpham Tamayo, wadae ramirez, sunita anayamarocco pham, diogenes rolon . So [facility] [phone].    ATENCIÓN: Si habla español, tiene a ledbetter disposición servicios gratuitos de asistencia lingüística. Llame al [phone].    We comply with applicable federal civil rights laws and [place] laws. We do not discriminate on the basis of race, color, national origin, age, disability sex, sexual orientation or gender identity.            Thank you!     Thank you for choosing EYE CLINIC  for your care. Our goal is always to provide you with excellent care. Hearing back from our patients is one way we can continue to improve our services. Please take a few minutes to complete the written survey that you may receive in the mail after your visit with us. Thank you!             Your Updated Medication List - Protect others around you: Learn how to safely use, store and throw away your medicines at www.disposemymeds.org.          This list is accurate as of: 9/20/17  1:03 PM.  Always use your most recent med list.                   Brand Name Dispense Instructions for use Diagnosis    albuterol 108 (90 BASE) MCG/ACT Inhaler    PROAIR HFA/PROVENTIL HFA/VENTOLIN HFA    1 Inhaler    Inhale 2 puffs into the lungs every 6 hours as needed for shortness of breath / dyspnea or wheezing    Cough, Upper  respiratory tract infection, unspecified type       ALEVE 220 MG capsule   Generic drug:  naproxen sodium      Take 220 mg by mouth as needed        azithromycin 250 MG tablet    ZITHROMAX    6 tablet    Two tablets first day, then one tablet daily for four days.    Cough       carboxymethylcellulose 0.5 % Soln ophthalmic solution    REFRESH PLUS     Place 1 drop into both eyes At Bedtime        CENTRUM SILVER per tablet      Take 1 tablet by mouth daily.        cholecalciferol 1000 UNIT tablet    vitamin D     Take 1 tablet by mouth daily.        EPINEPHrine 0.3 MG/0.3ML injection 2-pack    EPIPEN/ADRENACLICK/or ANY BX GENERIC EQUIV    1 each    Inject 0.3 mLs (0.3 mg) into the muscle once as needed for anaphylaxis    Wasp sting       esomeprazole 20 MG CR capsule    nexIUM     Take 20 mg by mouth every morning (before breakfast) Take 30-60 minutes before eating.        fish oil-omega-3 fatty acids 1000 MG capsule      Take 1 g by mouth daily.        hydrocortisone 2.5 % cream     30 g    Apply topically 2 times daily For 1-2 weeks    Rash       prednisoLONE acetate 1 % ophthalmic susp    PRED FORTE    15 mL    Place 1 drop into the right eye every 2 hours    Aftercare following surgery of a sensory organ       * Tafluprost 0.0015 % Soln    ZIOPTAN    30 each    Place 1 drop into the right eye At Bedtime    Glaucoma       * Tafluprost 0.0015 % Soln     30 mL    Place 1 drop into the right eye At Bedtime    Primary open angle glaucoma of right eye, moderate stage       * Notice:  This list has 2 medication(s) that are the same as other medications prescribed for you. Read the directions carefully, and ask your doctor or other care provider to review them with you.

## 2017-09-20 NOTE — NURSING NOTE
Chief Complaints and History of Present Illnesses   Patient presents with     Post Op (Ophthalmology) Right Eye     s/p Trabeculectomy mitomycin-C right eye 8/30/17     HPI    Affected eye(s):  Right   Symptoms:        Duration:  3 weeks   Frequency:  Constant       Do you have eye pain now?:  No      Comments:  Pt. States no change in VA BE.  No c/o comfort BE.  Nadege Castorena COT 12:44 PM September 20, 2017

## 2017-09-20 NOTE — PROGRESS NOTES
Primary open angle glaucoma (POAG)  moderate stage in both eyes  Postoperative week #3 s/p Trabeculectomy mitomycin-C right eye 8/30/17  Trabeculectomy mitomycin-C left eye 8/21/15    Vision stable, mildly blurry in both eyes. Eyes comfortable.     Family history: Positive    Current Ophthalmic Rx:  Zioptan at bedtime right eye (stopped after surgery)  Intolerant to many drops (alphagan, azopt, non-preserved cosopt, lumigan)  Artificial tears as needed right eye only      Prior Ophthalmic Surgeries:  Selected laser trabeculoplasty (SLT) right eye done 7-14-14  Trabeculectomy mitomycin-C left eye 8/21/15  Trabeculectomy mitomycin-C right eye 8/30/17    Assessment:  Moderate Primary open angle glaucoma (POAG)  both eyes     Prefers aggressive treatment given family history   Prior OCT retinal nerve fiber layer continued downward trend, but no change for past year right eye    slight worsening left eye may have occurred before trabeculectomy left eye    Visual fields show nasal step right eye, has been present past several years, appears possibly worse     Postoperative Trabeculectomy mitomycin-C right eye 8/30/17   Used 1 minute of 0.4mg/cc mitomycin-C and 3 sutures    Trabeculectomy mitomycin-C left eye (8-21-15) doing well   Used 1.5 minutes of 0.2 mg.cc mitomycin-C and 3 sutures   No medications Left eye    Plan:   Trabeculectomy mitomycin-C right eye 8/30/17   IOP 05    Use prednisolone acetate 1%  every 3 hours while awake for 1 week, then four times a day for 1 week, three times a day for 1 week, twice a day for 1 week, once a day for 1 week, then stop.    Return to clinic 2 weeks    Attending Physician Attestation:  Complete documentation of historical and exam elements from today's encounter can be found in the full encounter summary report (not reduplicated in this progress note). I personally obtained the chief complaint(s) and history of present illness. I confirmed and edited asnecessary the review of  systems, past medical/surgical history, family history, social history, and examination findings as documented by others; and I examined the patient myself. I personally reviewed the relevant tests, images, and reports as documented above. I formulated and edited as necessary the assessment and plan and discussed the findings and management plan with the patient and family.  - Annamarie Cade MD 12:59 PM 9/20/2017

## 2017-10-04 ENCOUNTER — OFFICE VISIT (OUTPATIENT)
Dept: OPHTHALMOLOGY | Facility: CLINIC | Age: 67
End: 2017-10-04
Attending: OPHTHALMOLOGY
Payer: MEDICARE

## 2017-10-04 DIAGNOSIS — Z48.810 AFTERCARE FOLLOWING SURGERY OF A SENSORY ORGAN: Primary | ICD-10-CM

## 2017-10-04 PROCEDURE — 99213 OFFICE O/P EST LOW 20 MIN: CPT | Mod: ZF

## 2017-10-04 ASSESSMENT — REFRACTION_WEARINGRX
OS_SPHERE: -6.00
OS_CYLINDER: +2.00
OD_ADD: +2.75
OS_AXIS: 084
SPECS_TYPE: PAL
OD_CYLINDER: +1.25
OD_AXIS: 078
OD_SPHERE: -5.50
OS_ADD: +2.75

## 2017-10-04 ASSESSMENT — CONF VISUAL FIELD
METHOD: COUNTING FINGERS
OS_NORMAL: 1

## 2017-10-04 ASSESSMENT — TONOMETRY
IOP_METHOD: APPLANATION
IOP_METHOD: APPLANATION
OS_IOP_MMHG: 12
OS_IOP_MMHG: 8
OD_IOP_MMHG: 6
OD_IOP_MMHG: 7

## 2017-10-04 ASSESSMENT — SLIT LAMP EXAM - LIDS
COMMENTS: NORMAL
COMMENTS: NORMAL

## 2017-10-04 ASSESSMENT — VISUAL ACUITY
OS_CC+: -3
OD_CC+: -3
METHOD: SNELLEN - LINEAR
OD_CC: 20/20
OS_CC: 20/25

## 2017-10-04 ASSESSMENT — CUP TO DISC RATIO
OS_RATIO: 0.8
OD_RATIO: 0.85

## 2017-10-04 NOTE — PROGRESS NOTES
Primary open angle glaucoma (POAG)  moderate stage in both eyes  Postoperative week #5 s/p Trabeculectomy mitomycin-C right eye 8/30/17  Trabeculectomy mitomycin-C left eye 8/21/15    Vision has been stable since last, eyes are comfortable without pain.     Family history: Positive    Current Ophthalmic Rx:  Prednisolone taper- three times a day today right eye only   Zioptan at bedtime right eye (stopped after surgery)  Intolerant to many drops (alphagan, azopt, non-preserved cosopt, lumigan)  Artificial tears as needed right eye only      Prior Ophthalmic Surgeries:  Selected laser trabeculoplasty (SLT) right eye done 7-14-14  Trabeculectomy mitomycin-C left eye 8/21/15  Trabeculectomy mitomycin-C right eye 8/30/17    Assessment:  Moderate Primary open angle glaucoma (POAG)  both eyes     Prefers aggressive treatment given family history   Prior OCT retinal nerve fiber layer continued downward trend, but no change for past year right eye    slight worsening left eye may have occurred before trabeculectomy left eye    Visual fields show nasal step right eye, has been present past several years, appears possibly worse     Postoperative week #5 Trabeculectomy mitomycin-C right eye 8/30/17   Used 1 minute of 0.4mg/cc mitomycin-C and 3 sutures    Trabeculectomy mitomycin-C left eye (8-21-15) doing well   Used 1.5 minutes of 0.2 mg.cc mitomycin-C and 3 sutures   No medications Left eye    Plan:   Trabeculectomy mitomycin-C right eye 8/30/17   IOP 07   Continue prednisolone taper    RV 2-3 weeks with intraocular pressure check.     Josie Mccallum MD  Ophthalmology PGY3    Attending Physician Attestation:  Complete documentation of historical and exam elements from today's encounter can be found in the full encounter summary report (not reduplicated in this progress note). I personally obtained the chief complaint(s) and history of present illness. I confirmed and edited asnecessary the review of systems, past  medical/surgical history, family history, social history, and examination findings as documented by others; and I examined the patient myself. I personally reviewed the relevant tests, images, and reports as documented above. I formulated and edited as necessary the assessment and plan and discussed the findings and management plan with the patient and family.  - Annamarie Cade MD 1:00 PM 10/4/2017

## 2017-10-04 NOTE — NURSING NOTE
Chief Complaints and History of Present Illnesses   Patient presents with     Post Op (Ophthalmology) Right Eye       5 week after trab     HPI    Last Eye Exam:  9/20/17   Affected eye(s):  Right   Symptoms:        Duration:  5 weeks   Frequency:  Constant       Do you have eye pain now?:  No      Comments:  She is here today for a 5 week post op after Trab RE  She says her vision is good. She has been using old glasses as her new glasses became scratched.  She says when she has lowered the amount Prednisolone, she notices discomfort in her eyes for awhile.       Kurt Rivers COT 12:25 PM October 4, 2017

## 2017-10-04 NOTE — MR AVS SNAPSHOT
After Visit Summary   10/4/2017    Anahy Villegas    MRN: [id number]           Patient Information     Date Of Birth          1950        Visit Information        Provider Department      10/4/2017 12:30 PM Annamarie Cade MD [facility]        Today's Diagnoses     Aftercare following surgery of a sensory organ    -  1       Follow-ups after your visit        Follow-up notes from your care team     Return in about 3 weeks (around 10/25/2017) for iop check.      Your next 10 appointments already scheduled     Oct 23, 2017 12:30 PM CDT   RETURN GLAUCOMA with Annamarie Cade MD   Eye Clinic ([facility])    [address]  [address]  [address]  [facility] [id number] [phone]              Who to contact     Please call your clinic at [phone] to:    Ask questions about your health    Make or cancel appointments    Discuss your medicines    Learn about your test results    Speak to your doctor   If you have compliments or concerns about an experience at your clinic, or if you wish to file a complaint, please contact [facility] Patient Relations at [phone] or email us at [email or web address]         Additional Information About Your Visit        MyChart Information     [employer] gives you secure access to your electronic health record. If you see a primary care provider, you can also send messages to your care team and make appointments. If you have questions, please call your primary care clinic.  If you do not have a primary care provider, please call [phone] and they will assist you.      [employer] is an electronic gateway that provides easy, online access to your medical records. With [employer], you can request a clinic appointment, read your test results, renew a prescription or communicate with your care team.     To access your existing account, please contact your [facility] or  call [phone] for assistance.        Care EveryWhere ID     This is your Care EveryWhere ID. This could be used by other organizations to access your [facility] medical records  [id number]         Blood Pressure from Last 3 Encounters:   08/29/16 (!) 160/95   03/07/16 127/81   08/21/15 141/87    Weight from Last 3 Encounters:   08/29/16 83.9 kg (185 lb)   03/07/16 83 kg (183 lb)   08/21/15 82.6 kg (182 lb)              Today, you had the following     No orders found for display       Primary Care Provider Office Phone # Fax #    Gregg Chu [phone]       [facility] [address] [address]        Equal Access to Services     VALERI TRIANA : Hadii aad ku hadasho Soomaali, waaxda luqadaha, qaybta kaalmada adeegyada, diogenes rolon . So [facility] [phone].    ATENCIÓN: Si habla español, tiene a ledbetter disposición servicios gratuitos de asistencia lingüística. Llame al [phone].    We comply with applicable federal civil rights laws and [place] laws. We do not discriminate on the basis of race, color, national origin, age, disability, sex, sexual orientation, or gender identity.            Thank you!     Thank you for choosing EYE CLINIC  for your care. Our goal is always to provide you with excellent care. Hearing back from our patients is one way we can continue to improve our services. Please take a few minutes to complete the written survey that you may receive in the mail after your visit with us. Thank you!             Your Updated Medication List - Protect others around you: Learn how to safely use, store and throw away your medicines at www.disposemymeds.org.          This list is accurate as of: 10/4/17  1:09 PM.  Always use your most recent med list.                   Brand Name Dispense Instructions for use Diagnosis    albuterol 108 (90 BASE) MCG/ACT Inhaler    PROAIR HFA/PROVENTIL HFA/VENTOLIN HFA    1 Inhaler     Inhale 2 puffs into the lungs every 6 hours as needed for shortness of breath / dyspnea or wheezing    Cough, Upper respiratory tract infection, unspecified type       ALEVE 220 MG capsule   Generic drug:  naproxen sodium      Take 220 mg by mouth as needed        azithromycin 250 MG tablet    ZITHROMAX    6 tablet    Two tablets first day, then one tablet daily for four days.    Cough       carboxymethylcellulose 0.5 % Soln ophthalmic solution    REFRESH PLUS     Place 1 drop into both eyes At Bedtime        CENTRUM SILVER per tablet      Take 1 tablet by mouth daily.        cholecalciferol 1000 UNIT tablet    vitamin D     Take 1 tablet by mouth daily.        EPINEPHrine 0.3 MG/0.3ML injection 2-pack    EPIPEN/ADRENACLICK/or ANY BX GENERIC EQUIV    1 each    Inject 0.3 mLs (0.3 mg) into the muscle once as needed for anaphylaxis    Wasp sting       esomeprazole 20 MG CR capsule    nexIUM     Take 20 mg by mouth every morning (before breakfast) Take 30-60 minutes before eating.        fish oil-omega-3 fatty acids 1000 MG capsule      Take 1 g by mouth daily.        hydrocortisone 2.5 % cream     30 g    Apply topically 2 times daily For 1-2 weeks    Rash       prednisoLONE acetate 1 % ophthalmic susp    PRED FORTE    15 mL    Place 1 drop into the right eye every 2 hours    Aftercare following surgery of a sensory organ       * Tafluprost 0.0015 % Soln    ZIOPTAN    30 each    Place 1 drop into the right eye At Bedtime    Glaucoma       * Tafluprost 0.0015 % Soln     30 mL    Place 1 drop into the right eye At Bedtime    Primary open angle glaucoma of right eye, moderate stage       * Notice:  This list has 2 medication(s) that are the same as other medications prescribed for you. Read the directions carefully, and ask your doctor or other care provider to review them with you.

## 2017-10-23 ENCOUNTER — OFFICE VISIT (OUTPATIENT)
Dept: OPHTHALMOLOGY | Facility: CLINIC | Age: 67
End: 2017-10-23
Attending: OPHTHALMOLOGY
Payer: MEDICARE

## 2017-10-23 DIAGNOSIS — Z48.810 AFTERCARE FOLLOWING SURGERY OF A SENSORY ORGAN: Primary | ICD-10-CM

## 2017-10-23 PROCEDURE — 99212 OFFICE O/P EST SF 10 MIN: CPT | Mod: ZF

## 2017-10-23 ASSESSMENT — SLIT LAMP EXAM - LIDS
COMMENTS: NORMAL
COMMENTS: NORMAL

## 2017-10-23 ASSESSMENT — REFRACTION_WEARINGRX
OD_CYLINDER: +1.25
OS_SPHERE: -6.00
OD_SPHERE: -5.50
OD_AXIS: 078
OS_AXIS: 084
OS_CYLINDER: +2.00
OS_ADD: +2.75
SPECS_TYPE: PAL
OD_ADD: +2.75

## 2017-10-23 ASSESSMENT — TONOMETRY
OS_IOP_MMHG: 07
IOP_METHOD: APPLANATION
OD_IOP_MMHG: 05

## 2017-10-23 ASSESSMENT — VISUAL ACUITY
OD_CC+: -2
METHOD: SNELLEN - LINEAR
OD_CC: 20/20
CORRECTION_TYPE: GLASSES
OS_CC: 20/20

## 2017-10-23 ASSESSMENT — CUP TO DISC RATIO
OD_RATIO: 0.85
OS_RATIO: 0.8

## 2017-10-23 NOTE — NURSING NOTE
Chief Complaints and History of Present Illnesses   Patient presents with     Post Op (Ophthalmology) Right Eye     Trabeculectomy mitomycin-C right eye 8/30/17     HPI    Affected eye(s):  Right   Symptoms:        Duration:  3 weeks   Frequency:  Constant       Do you have eye pain now?:  No      Comments:  Pt. States that she is doing well.  No change in VA BE.  No c/o comfort BE.  Nadege Castorena COT 12:40 PM October 23, 2017

## 2017-10-23 NOTE — PROGRESS NOTES
Primary open angle glaucoma (POAG)  moderate stage in both eyes  Postoperative Trabeculectomy mitomycin-C right eye 8/30/17  Trabeculectomy mitomycin-C left eye 8/21/15    Vision has been stable since last, eyes are comfortable without pain.     Family history: Positive    Current Ophthalmic Rx:  Prednisolone taper- daily for 2 maore days right eye only   Intolerant to many drops (alphagan, azopt, non-preserved cosopt, lumigan)  Artificial tears as needed right eye only      Prior Ophthalmic Surgeries:  Selected laser trabeculoplasty (SLT) right eye done 7-14-14  Trabeculectomy mitomycin-C left eye 8/21/15  Trabeculectomy mitomycin-C right eye 8/30/17    Assessment:  Moderate Primary open angle glaucoma (POAG)  both eyes     Prefers aggressive treatment given family history   Prior OCT retinal nerve fiber layer continued downward trend, but no change for past year right eye    slight worsening left eye may have occurred before trabeculectomy left eye    Visual fields show nasal step right eye, has been present past several years, appears possibly worse     Trabeculectomy mitomycin-C right eye 8/30/17   Used 1 minute of 0.4mg/cc mitomycin-C and 3 sutures    Trabeculectomy mitomycin-C left eye (8-21-15) doing well   Used 1.5 minutes of 0.2 mg.cc mitomycin-C and 3 sutures   No medications Left eye    Plan:   Trabeculectomy mitomycin-C right eye 8/30/17   IOP 05   finish prednisolone taper    RV 6 weeks with intraocular pressure check.     Attending Physician Attestation:  Complete documentation of historical and exam elements from today's encounter can be found in the full encounter summary report (not reduplicated in this progress note). I personally obtained the chief complaint(s) and history of present illness. I confirmed and edited asnecessary the review of systems, past medical/surgical history, family history, social history, and examination findings as documented by others; and I examined the patient myself. I  personally reviewed the relevant tests, images, and reports as documented above. I formulated and edited as necessary the assessment and plan and discussed the findings and management plan with the patient and family.  - Annamarie Cade MD 1:16 PM 10/23/2017

## 2017-10-23 NOTE — MR AVS SNAPSHOT
After Visit Summary   10/23/2017    Anahy Villegas    MRN: [id number]           Patient Information     Date Of Birth          1950        Visit Information        Provider Department      10/23/2017 12:30 PM Annamarie Cade MD [facility]        Today's Diagnoses     Aftercare following surgery of a sensory organ    -  1       Follow-ups after your visit        Follow-up notes from your care team     Return in about 7 weeks (around 12/11/2017).      Your next 10 appointments already scheduled     Dec 13, 2017  2:15 PM CST   RETURN GLAUCOMA with Annamarie Cade MD   Eye Clinic ([facility])    [address]  [address]  [address]  [facility] [address]   [phone]              Who to contact     Please call your clinic at [phone] to:    Ask questions about your health    Make or cancel appointments    Discuss your medicines    Learn about your test results    Speak to your doctor   If you have compliments or concerns about an experience at your clinic, or if you wish to file a complaint, please contact [facility] Patient Relations at [phone] or email us at [email or web address]         Additional Information About Your Visit        MyChart Information     [employer] gives you secure access to your electronic health record. If you see a primary care provider, you can also send messages to your care team and make appointments. If you have questions, please call your primary care clinic.  If you do not have a primary care provider, please call [phone] and they will assist you.      [employer] is an electronic gateway that provides easy, online access to your medical records. With [employer], you can request a clinic appointment, read your test results, renew a prescription or communicate with your care team.     To access your existing account, please contact your [facility] or call  [phone] for assistance.        Care EveryWhere ID     This is your Care EveryWhere ID. This could be used by other organizations to access your [facility] medical records  [id number]         Blood Pressure from Last 3 Encounters:   08/29/16 (!) 160/95   03/07/16 127/81   08/21/15 141/87    Weight from Last 3 Encounters:   08/29/16 83.9 kg (185 lb)   03/07/16 83 kg (183 lb)   08/21/15 82.6 kg (182 lb)              Today, you had the following     No orders found for display       Primary Care Provider Office Phone # Fax #    Gregg Chu [phone]       [facility] [address] [address]        Equal Access to Services     VALERI TRIANA : Hadii veronica marrufoo Soaltagracia, waaxda luqadaha, qaybta kaalmada adeyanciyarocco, diogenes rolon . So [facility] [phone].    ATENCIÓN: Si habla español, tiene a ledbetter disposición servicios gratuitos de asistencia lingüística. Llame al [phone].    We comply with applicable federal civil rights laws and [place] laws. We do not discriminate on the basis of race, color, national origin, age, disability, sex, sexual orientation, or gender identity.            Thank you!     Thank you for choosing EYE CLINIC  for your care. Our goal is always to provide you with excellent care. Hearing back from our patients is one way we can continue to improve our services. Please take a few minutes to complete the written survey that you may receive in the mail after your visit with us. Thank you!             Your Updated Medication List - Protect others around you: Learn how to safely use, store and throw away your medicines at www.disposemymeds.org.          This list is accurate as of: 10/23/17  1:27 PM.  Always use your most recent med list.                   Brand Name Dispense Instructions for use Diagnosis    albuterol 108 (90 BASE) MCG/ACT Inhaler    PROAIR HFA/PROVENTIL HFA/VENTOLIN HFA    1 Inhaler    Inhale 2  puffs into the lungs every 6 hours as needed for shortness of breath / dyspnea or wheezing    Cough, Upper respiratory tract infection, unspecified type       ALEVE 220 MG capsule   Generic drug:  naproxen sodium      Take 220 mg by mouth as needed        azithromycin 250 MG tablet    ZITHROMAX    6 tablet    Two tablets first day, then one tablet daily for four days.    Cough       carboxymethylcellulose 0.5 % Soln ophthalmic solution    REFRESH PLUS     Place 1 drop into both eyes At Bedtime        CENTRUM SILVER per tablet      Take 1 tablet by mouth daily.        cholecalciferol 1000 UNIT tablet    vitamin D3     Take 1 tablet by mouth daily.        EPINEPHrine 0.3 MG/0.3ML injection 2-pack    EPIPEN/ADRENACLICK/or ANY BX GENERIC EQUIV    1 each    Inject 0.3 mLs (0.3 mg) into the muscle once as needed for anaphylaxis    Wasp sting       esomeprazole 20 MG CR capsule    nexIUM     Take 20 mg by mouth every morning (before breakfast) Take 30-60 minutes before eating.        fish oil-omega-3 fatty acids 1000 MG capsule      Take 1 g by mouth daily.        hydrocortisone 2.5 % cream     30 g    Apply topically 2 times daily For 1-2 weeks    Rash       prednisoLONE acetate 1 % ophthalmic susp    PRED FORTE    15 mL    Place 1 drop into the right eye every 2 hours    Aftercare following surgery of a sensory organ       * Tafluprost 0.0015 % Soln    ZIOPTAN    30 each    Place 1 drop into the right eye At Bedtime    Glaucoma       * Tafluprost 0.0015 % Soln     30 mL    Place 1 drop into the right eye At Bedtime    Primary open angle glaucoma of right eye, moderate stage       * Notice:  This list has 2 medication(s) that are the same as other medications prescribed for you. Read the directions carefully, and ask your doctor or other care provider to review them with you.

## 2017-11-06 NOTE — PROGRESS NOTES
Outpatient Physical Therapy Discharge Note     Patient: Anahy Villegas  : 1950    Beginning/End Dates of Reporting Period:  17 to 2017    Referring Provider: Gregg Haney    Therapy Diagnosis: L knee pain and LBP      Client Self Report: Pt relates knee is stiff and so is back however she was working in the garden. Pt reports completeing approx 50% of HEp as she does not like core exercises. Pt reports she has not been biking. Pt would liike to return to therapy in July or August but is not really sure. Overall she is doing better           Goals:  Goal Identifier sleep   Goal Description Pt will report waking up less than 2x/night due to pain in order to get a restful night's sleep    Target Date 17   Date Met      Progress:able to sleep until approx 3am but then continues to wake up      Goal Identifier ABDULLAHI   Goal Description Pt will report <10% disability on ABDULLAHI to demonstrate improved ability to complete daily activities and demonstrate significant clinical improvement   Target Date 17   Date Met      Progress:did not assess at last visit     Goal Identifier return to exercise class   Goal Description Pt will demonstrate 5/5 LE strength w less than 1/10 pain in order to allow her to return to exercise class   Target Date 17   Date Met      Progress:not met     Goal Identifier stairs   Goal Description Pt will demonstrates ascending/descending stair w reciprocal gait pattern and report less than 1/10 pain in order to return to PLOF.   Target Date 17   Date Met      Progress:not met       Progress Toward Goals:   Progress this reporting period: Pt would like to possibly come back in 4 weeks, she is unsure at this time however pt relates she does not want any more exercises. Pt encouraged to continue HEP as therapist may not be of much help if she declines advancing HEP however will leave chart open for 4 weeks in case pt returns. Also discussed f/u with MD however pt  declines as she does not want more injections or further imaging. Update: 11/6/17 - Pt failed to return thus is being d/c at this time        Plan:  Discharge from therapy.    Discharge:    Reason for Discharge: Patient chooses to discontinue therapy.    Equipment Issued: NA    Discharge Plan: Patient to continue home program.    Andreina Cruz  Physical Therapist  [facility]  [address] [place] [id number]  [email or web address]   [email or web address]   Office: [phone] Fax: [phone]

## 2017-11-13 ENCOUNTER — OFFICE VISIT - HEALTHEAST (OUTPATIENT)
Dept: INTERNAL MEDICINE | Facility: CLINIC | Age: 67
End: 2017-11-13

## 2017-11-13 DIAGNOSIS — M85.89 OSTEOPENIA OF MULTIPLE SITES: ICD-10-CM

## 2017-11-13 DIAGNOSIS — Z23 NEED FOR VACCINATION: ICD-10-CM

## 2017-11-13 DIAGNOSIS — H40.9 GLAUCOMA: ICD-10-CM

## 2017-11-13 DIAGNOSIS — Z00.00 ROUTINE GENERAL MEDICAL EXAMINATION AT A HEALTH CARE FACILITY: ICD-10-CM

## 2017-11-13 DIAGNOSIS — Z86.39 H/O HYPERPARATHYROIDISM: ICD-10-CM

## 2017-11-13 LAB
CHOLEST SERPL-MCNC: 235 MG/DL
FASTING STATUS PATIENT QL REPORTED: YES
HDLC SERPL-MCNC: 66 MG/DL
LDLC SERPL CALC-MCNC: 156 MG/DL
TRIGL SERPL-MCNC: 64 MG/DL

## 2017-11-13 ASSESSMENT — MIFFLIN-ST. JEOR: SCORE: 1372.3

## 2017-11-14 ENCOUNTER — COMMUNICATION - HEALTHEAST (OUTPATIENT)
Dept: INTERNAL MEDICINE | Facility: CLINIC | Age: 67
End: 2017-11-14

## 2017-12-13 ENCOUNTER — OFFICE VISIT (OUTPATIENT)
Dept: OPHTHALMOLOGY | Facility: CLINIC | Age: 67
End: 2017-12-13
Attending: OPHTHALMOLOGY
Payer: MEDICARE

## 2017-12-13 DIAGNOSIS — H40.1132 PRIMARY OPEN ANGLE GLAUCOMA OF BOTH EYES, MODERATE STAGE: Primary | ICD-10-CM

## 2017-12-13 PROCEDURE — 92015 DETERMINE REFRACTIVE STATE: CPT | Mod: ZF

## 2017-12-13 PROCEDURE — 99213 OFFICE O/P EST LOW 20 MIN: CPT | Mod: ZF

## 2017-12-13 ASSESSMENT — SLIT LAMP EXAM - LIDS
COMMENTS: NORMAL
COMMENTS: NORMAL

## 2017-12-13 ASSESSMENT — REFRACTION_MANIFEST
OS_ADD: +2.75
OD_ADD: +2.75
OD_AXIS: 072
OS_AXIS: 091
OS_CYLINDER: +2.00
OD_SPHERE: -5.75
OS_SPHERE: -6.25
OD_CYLINDER: +1.75

## 2017-12-13 ASSESSMENT — TONOMETRY
OD_IOP_MMHG: 04
IOP_METHOD: APPLANATION
OS_IOP_MMHG: 08

## 2017-12-13 ASSESSMENT — REFRACTION_WEARINGRX
OS_SPHERE: -6.00
OD_CYLINDER: +1.25
OD_SPHERE: -5.50
OS_AXIS: 084
OD_AXIS: 078
OS_ADD: +2.75
OD_ADD: +2.75
SPECS_TYPE: PAL
OS_CYLINDER: +2.00

## 2017-12-13 ASSESSMENT — VISUAL ACUITY
OS_CC: 20/30
OD_CC+: -2
METHOD: SNELLEN - LINEAR
CORRECTION_TYPE: GLASSES
OS_CC+: -2
OD_CC: 20/30

## 2017-12-13 ASSESSMENT — CUP TO DISC RATIO
OS_RATIO: 0.8
OD_RATIO: 0.85

## 2017-12-13 ASSESSMENT — CONF VISUAL FIELD
OS_NORMAL: 1
OD_NORMAL: 1

## 2017-12-13 NOTE — MR AVS SNAPSHOT
After Visit Summary   12/13/2017    Anahy Villegas    MRN: [id number]           Patient Information     Date Of Birth          1950        Visit Information        Provider Department      12/13/2017 2:15 PM Annamarie Cade MD [facility]        Today's Diagnoses     Primary open angle glaucoma of both eyes, moderate stage    -  1       Follow-ups after your visit        Follow-up notes from your care team     Return in about 6 months (around 6/13/2018) for visual field 24-2.      Your next 10 appointments already scheduled     May 23, 2018  1:00 PM CDT   RETURN GLAUCOMA with Annamarie Cade MD   Eye Clinic ([facility])    [address]  [address]  [address]  [place] [id number] [phone]              Who to contact     Please call your clinic at [phone] to:    Ask questions about your health    Make or cancel appointments    Discuss your medicines    Learn about your test results    Speak to your doctor   If you have compliments or concerns about an experience at your clinic, or if you wish to file a complaint, please contact [facility] Patient Relations at [phone] or email us at [email or web address]         Additional Information About Your Visit        MyChart Information     [employer] gives you secure access to your electronic health record. If you see a primary care provider, you can also send messages to your care team and make appointments. If you have questions, please call your primary care clinic.  If you do not have a primary care provider, please call [phone] and they will assist you.      [employer] is an electronic gateway that provides easy, online access to your medical records. With [employer], you can request a clinic appointment, read your test results, renew a prescription or communicate with your care team.     To access your existing account, please contact your [facility] or call [phone] for assistance.        Care EveryWhere ID     This is your Care EveryWhere ID. This could be used by other organizations to access your [place] medical records  [id number]         Blood Pressure from Last 3 Encounters:   08/29/16 (!) 160/95   03/07/16 127/81   08/21/15 141/87    Weight from Last 3 Encounters:   08/29/16 83.9 kg (185 lb)   03/07/16 83 kg (183 lb)   08/21/15 82.6 kg (182 lb)              Today, you had the following     No orders found for display         Today's Medication Changes          These changes are accurate as of: 12/13/17  4:16 PM.  If you have any questions, ask your nurse or doctor.               Stop taking these medicines if you haven't already. Please contact your care team if you have questions.     esomeprazole 20 MG CR capsule   Commonly known as:  nexIUM   Stopped by:  Annamarie Cade MD                    Primary Care Provider Office Phone # Fax #    Gregg Chu [phone]       [facility] [address] [address]        Equal Access to Services     [facility]: Hadii veronica thomas Soaltagracia, waaxda lustefano, qaybta kieshaalanirudh pham, diogenes rolon . So [facility] [phone].    ATENCIÓN: Si habla español, tiene a ledbetter disposición servicios gratuitos de asistencia lingüística. Patriciaame al [phone].    We comply with applicable federal civil rights laws and [place] laws. We do not discriminate on the basis of race, color, national origin, age, disability, sex, sexual orientation, or gender identity.            Thank you!     Thank you for choosing EYE CLINIC  for your care. Our goal is always to provide you with excellent care. Hearing back from our patients is one way we can continue to improve our services. Please take a few minutes to complete the written survey that you may receive in the mail after your visit with us. Thank you!             Your  Updated Medication List - Protect others around you: Learn how to safely use, store and throw away your medicines at www.disposemymeds.org.          This list is accurate as of: 12/13/17  4:16 PM.  Always use your most recent med list.                   Brand Name Dispense Instructions for use Diagnosis    albuterol 108 (90 BASE) MCG/ACT Inhaler    PROAIR HFA/PROVENTIL HFA/VENTOLIN HFA    1 Inhaler    Inhale 2 puffs into the lungs every 6 hours as needed for shortness of breath / dyspnea or wheezing    Cough, Upper respiratory tract infection, unspecified type       ALEVE 220 MG capsule   Generic drug:  naproxen sodium      Take 220 mg by mouth as needed        azithromycin 250 MG tablet    ZITHROMAX    6 tablet    Two tablets first day, then one tablet daily for four days.    Cough       carboxymethylcellulose 0.5 % Soln ophthalmic solution    REFRESH PLUS     Place 1 drop into both eyes At Bedtime        CENTRUM SILVER per tablet      Take 1 tablet by mouth daily.        cholecalciferol 1000 UNIT tablet    vitamin D3     Take 1 tablet by mouth daily.        EPINEPHrine 0.3 MG/0.3ML injection 2-pack    EPIPEN/ADRENACLICK/or ANY BX GENERIC EQUIV    1 each    Inject 0.3 mLs (0.3 mg) into the muscle once as needed for anaphylaxis    Wasp sting       fish oil-omega-3 fatty acids 1000 MG capsule      Take 1 g by mouth daily.        hydrocortisone 2.5 % cream     30 g    Apply topically 2 times daily For 1-2 weeks    Rash       prednisoLONE acetate 1 % ophthalmic susp    PRED FORTE    15 mL    Place 1 drop into the right eye every 2 hours    Aftercare following surgery of a sensory organ       ranitidine 150 MG tablet    ZANTAC     Take by mouth 2 times daily        Tafluprost 0.0015 % Soln    ZIOPTAN    30 each    Place 1 drop into the right eye At Bedtime    Glaucoma

## 2017-12-13 NOTE — NURSING NOTE
Chief Complaints and History of Present Illnesses   Patient presents with     Glaucoma Follow Up     HPI    Affected eye(s):  Both   Symptoms:     No blurred vision   No decreased vision   No floaters   No flashes   Dryness         Do you have eye pain now?:  No      Comments:  Glaucoma follow up.    The patient requests an MR today.  Catherine Ordaz, COA 2:39 PM 12/13/2017

## 2017-12-13 NOTE — PROGRESS NOTES
Primary open angle glaucoma (POAG)  moderate stage in both eyes  Postoperative Trabeculectomy mitomycin-C right eye 8/30/17  Trabeculectomy mitomycin-C left eye 8/21/15    Vision has been stable since last, eyes are comfortable without pain.     Family history: Positive    Current Ophthalmic Rx:  Prednisolone taper- daily for 2 maore days right eye only   Intolerant to many drops (alphagan, azopt, non-preserved cosopt, lumigan)  Artificial tears as needed right eye only      Prior Ophthalmic Surgeries:  Selected laser trabeculoplasty (SLT) right eye done 7-14-14  Trabeculectomy mitomycin-C left eye 8/21/15  Trabeculectomy mitomycin-C right eye 8/30/17    Assessment:  Moderate Primary open angle glaucoma (POAG)  both eyes     Prefers aggressive treatment given family history   Prior OCT retinal nerve fiber layer continued downward trend, but no change for past year right eye    slight worsening left eye may have occurred before trabeculectomy left eye    Visual fields show nasal step right eye, has been present past several years, appears possibly worse     Trabeculectomy mitomycin-C right eye 8/30/17   Used 1 minute of 0.4mg/cc mitomycin-C and 3 sutures    Trabeculectomy mitomycin-C left eye (8-21-15) doing well   Used 1.5 minutes of 0.2 mg.cc mitomycin-C and 3 sutures   No medications Left eye    Plan:   Excellent intraocular pressure   Will only do visual field yearly with intraocular pressure less than 10   Return to clinic 6 months with 24-2 both eyes for new baseline    Attending Physician Attestation:  Complete documentation of historical and exam elements from today's encounter can be found in the full encounter summary report (not reduplicated in this progress note). I personally obtained the chief complaint(s) and history of present illness. I confirmed and edited asnecessary the review of systems, past medical/surgical history, family history, social history, and examination findings as documented by  others; and I examined the patient myself. I personally reviewed the relevant tests, images, and reports as documented above. I formulated and edited as necessary the assessment and plan and discussed the findings and management plan with the patient and family.  - Annamarie Cade MD 3:57 PM 12/13/2017

## 2018-03-05 ENCOUNTER — HOSPITAL ENCOUNTER (EMERGENCY)
Facility: CLINIC | Age: 68
Discharge: HOME | End: 2018-03-05
Attending: EMERGENCY MEDICINE | Admitting: EMERGENCY MEDICINE
Payer: MEDICARE

## 2018-03-05 ENCOUNTER — APPOINTMENT (OUTPATIENT)
Dept: GENERAL RADIOLOGY | Facility: CLINIC | Age: 68
End: 2018-03-05
Attending: EMERGENCY MEDICINE
Payer: MEDICARE

## 2018-03-05 VITALS
WEIGHT: 185 LBS | TEMPERATURE: 99.2 F | BODY MASS INDEX: 30.82 KG/M2 | SYSTOLIC BLOOD PRESSURE: 150 MMHG | HEIGHT: 65 IN | OXYGEN SATURATION: 95 % | DIASTOLIC BLOOD PRESSURE: 103 MMHG

## 2018-03-05 DIAGNOSIS — J20.9 BRONCHITIS, ACUTE, WITH BRONCHOSPASM: ICD-10-CM

## 2018-03-05 DIAGNOSIS — F43.21 GRIEF REACTION: ICD-10-CM

## 2018-03-05 DIAGNOSIS — R03.0 ELEVATED BLOOD PRESSURE READING WITHOUT DIAGNOSIS OF HYPERTENSION: ICD-10-CM

## 2018-03-05 PROBLEM — M85.89 OSTEOPENIA OF MULTIPLE SITES: Status: ACTIVE | Noted: 2017-11-13

## 2018-03-05 PROCEDURE — 25000125 ZZHC RX 250: Performed by: EMERGENCY MEDICINE

## 2018-03-05 PROCEDURE — 40000917 ZZH STATISTIC PEAK FLOW MEASUREMENT

## 2018-03-05 PROCEDURE — 99284 EMERGENCY DEPT VISIT MOD MDM: CPT | Mod: Z6 | Performed by: EMERGENCY MEDICINE

## 2018-03-05 PROCEDURE — 99284 EMERGENCY DEPT VISIT MOD MDM: CPT | Mod: 25 | Performed by: EMERGENCY MEDICINE

## 2018-03-05 PROCEDURE — 25000132 ZZH RX MED GY IP 250 OP 250 PS 637: Mod: GY | Performed by: EMERGENCY MEDICINE

## 2018-03-05 PROCEDURE — 71046 X-RAY EXAM CHEST 2 VIEWS: CPT

## 2018-03-05 PROCEDURE — 94640 AIRWAY INHALATION TREATMENT: CPT

## 2018-03-05 PROCEDURE — A9270 NON-COVERED ITEM OR SERVICE: HCPCS | Mod: GY | Performed by: EMERGENCY MEDICINE

## 2018-03-05 RX ORDER — AZITHROMYCIN 250 MG/1
TABLET, FILM COATED ORAL
Qty: 6 TABLET | Refills: 0 | Status: SHIPPED | OUTPATIENT
Start: 2018-03-05 | End: 2018-03-10

## 2018-03-05 RX ORDER — PREDNISONE 20 MG/1
40 TABLET ORAL DAILY
Qty: 10 TABLET | Refills: 0 | Status: SHIPPED | OUTPATIENT
Start: 2018-03-05 | End: 2018-03-10

## 2018-03-05 RX ORDER — CLONIDINE HYDROCHLORIDE 0.1 MG/1
0.1 TABLET ORAL ONCE
Status: COMPLETED | OUTPATIENT
Start: 2018-03-05 | End: 2018-03-05

## 2018-03-05 RX ORDER — LORAZEPAM 1 MG/1
.5-1 TABLET ORAL 2 TIMES DAILY PRN
Qty: 15 TABLET | Refills: 0 | Status: SHIPPED | OUTPATIENT
Start: 2018-03-05 | End: 2019-03-15

## 2018-03-05 RX ORDER — IPRATROPIUM BROMIDE AND ALBUTEROL SULFATE 2.5; .5 MG/3ML; MG/3ML
3 SOLUTION RESPIRATORY (INHALATION) ONCE
Status: COMPLETED | OUTPATIENT
Start: 2018-03-05 | End: 2018-03-05

## 2018-03-05 RX ADMIN — IPRATROPIUM BROMIDE AND ALBUTEROL SULFATE 3 ML: .5; 3 SOLUTION RESPIRATORY (INHALATION) at 09:22

## 2018-03-05 RX ADMIN — CLONIDINE HYDROCHLORIDE 0.1 MG: 0.1 TABLET ORAL at 09:56

## 2018-03-05 NOTE — ED AVS SNAPSHOT
[facility]    [address] [facility] [address]    Phone:  [phone]    Fax:  [phone]                                       Anahy Villegas   MRN: [id number]    Department:  [facility] Emergency Department   Date of Visit:  3/5/2018           After Visit Summary Signature Page     I have received my discharge instructions, and my questions have been answered. I have discussed any challenges I see with this plan with the nurse or doctor.    ..........................................................................................................................................  Patient/Patient Representative Signature      ..........................................................................................................................................  Patient Representative Print Name and Relationship to Patient    ..................................................               ................................................  Date                                            Time    ..........................................................................................................................................  Reviewed by Signature/Title    ...................................................              ..............................................  Date                                                            Time

## 2018-03-05 NOTE — ED NOTES
Pt completed 750mg levaquin Thursday.  Pt was in [place] and treated for possible pneumonia 2/25/18, radiologist called stated no infiltrates on chest xray.  Pt continues to use inhaler, ventolin approx 4 times a day since prescribed.  Pt had f/u appt tomorrow, but wants to be seen today since in ED.  Pt took ventolin this AM and Coricidin for cold.

## 2018-03-05 NOTE — PROGRESS NOTES
Pre-neb Peak flow best of 3: 350    Post-neb peak flow best of 3: 325    No significant peak flow change     Pt states she thinks she is getting over pneumonia or bronchitis. She does have a Ventolin inhaler from home. Pt was not clear as to why (resp hx) other than she takes it when sick.    Respiratory Therapy Assessment    Assessment:     Reason for Assessment: Other (see comments) (03/05/18 0900)    Pulmonary History:    Pulmonary Status: Smoking history, less that 1 PPD (03/05/18 0900)    Smoking cessation information given: na  Surgical:  Surgical Status: No surgery or greater than/equal 4 weeks post-op (03/05/18 0900)    CXR:   Chest X-ray: Chronic changes or CXR pending (ordered, not yet taken) (03/05/18 0900)    Respiratory Pattern:    Respiratory Pattern: Regular pattern 12-20 (03/05/18 0900)    Breath Sounds:    Breath Sounds: Clear to auscultation (03/05/18 0900)    Effectiveness of Cough:     Cough Effectiveness: Strong, non-productive (03/05/18 0900)    Mental Status:    Mental Status: Alert, oriented, cooperative (03/05/18 0900)    Level of Activity:     Acuity Level : Acuity 5 (0-5 points) (03/05/18 0900)    Current O2 Requirement:   O2 Required for SpO2>=92%: No oxygen (03/05/18 0900)    Patient uses home oxygen:   no    Does the patient have a diagnosis of COPD? Pt not sure

## 2018-03-05 NOTE — ED NOTES
RN reviewed instructions in detail with patient AND family.  Pt verbalized understanding as well as family.  No further questions at this time.

## 2018-03-05 NOTE — ED PROVIDER NOTES
History     Chief Complaint   Patient presents with     Cough     HPI  Anahy Villegas is a 67 year old female who presents to the emergency room initially with her  who arrived in sudden cardiac arrest.  Patient Pamela .  Patient requested to be seen for progressive cough and congestion.  Recently traveled to [facility].  While there was seen for congestion.  Urgent care visit 2018 included a chest x-ray.  Patient reports the physician assistant who cared for her initially thought she had a right lower lobe infiltrate.  Was treated with Levaquin 750 mg daily ×5 days and Ventolin MDI as needed.  The patient 2 days later received a call indicating that the chest x-ray been reviewed by radiologist and that there was no infiltrate.  The patient continued to finish her antibiotics.  Felt better upon completion of antibiotics but now reports increasing cough, shortness of breath, congestion.  Cough is much more loose and productive.  Patient reports she was a smoker though quit 15 years ago.  With this congestion she has been using DayQuil, NyQuil.  No documented fever.  Travels in [place] did not include going to the [facility] with concerns for hantavirus.  Chest x-ray reported by patient also did not show any signs typical for valley fever.      Problem List:    Patient Active Problem List    Diagnosis Date Noted     Osteopenia of multiple sites 2017     Priority: Medium     Glaucoma 2015     Priority: Medium     H/O hyperparathyroidism 2015     Priority: Medium     CARDIOVASCULAR SCREENING; LDL GOAL LESS THAN 130 2013     Priority: Medium        Past Medical History:    Past Medical History:   Diagnosis Date     Gastro-oesophageal reflux disease      Glaucoma (increased eye pressure)      Hernia of unspecified site of abdominal cavity without mention of obstruction or gangrene right side     Nonsenile cataract      Ocular hypertension      Steroid responder,  bilateral        Past Surgical History:    Past Surgical History:   Procedure Laterality Date     ABDOMEN SURGERY       APPENDECTOMY OPEN  2/20/2011    APPENDECTOMY OPEN performed by AZUL LUX at [facility]     ARTHROSCOPY KNEE WITH MEDIAL MENISCECTOMY Left 4/7/2015    Procedure: ARTHROSCOPY KNEE WITH MEDIAL MENISCECTOMY;  Surgeon: Gregg Lopez MD;  Location:  OR     GYN SURGERY  2005    vag hysterectomy with prolasp repair     GYN SURGERY  2008/2011    mesh erosion repair     HEAD & NECK SURGERY  about 2005    parathyroid nodule removal     HERNIORRHAPHY INCISIONAL (LOCATION)  12/27/2011    Procedure:HERNIORRHAPHY INCISIONAL (LOCATION); LEFT ABDOMINAL WALL HERNIA REPAIR WITH MESH; Surgeon:KWAME HUTTON; Location: [facility]     HYSTERECTOMY       HYSTERECTOMY, PAP NO LONGER INDICATED       LAPAROSCOPIC APPENDECTOMY  2/20/2011    LAPAROSCOPIC APPENDECTOMY performed by AZUL LUX at [facility]     ORTHOPEDIC SURGERY      knee surgery age 18     SELECTIVE LASER TRABECULOPLASTY (SLT) OD (RIGHT EYE)  7/14/2014     TRABECULECTOMY, MITOMYCIN FILTER, COMBINED Left 8/21/2015    Procedure: COMBINED TRABECULECTOMY, MITOMYCIN FILTER;  Surgeon: Annamarie Cade MD;  Location: [facility]       Family History:    Family History   Problem Relation Age of Onset     Hypertension Mother      Thyroid Disease Mother      CANCER Father      prostate     Glaucoma Father      CANCER Maternal Grandmother      breast cancer in 40's     DIABETES No family hx of        Social History:  Marital Status:   [2]  Social History   Substance Use Topics     Smoking status: Former Smoker     Smokeless tobacco: Never Used     Alcohol use Yes      Comment: occ beer        Medications:      ranitidine (ZANTAC) 150 MG tablet   prednisoLONE acetate (PRED FORTE) 1 % ophthalmic susp   carboxymethylcellulose (REFRESH PLUS) 0.5 % SOLN ophthalmic solution   EPINEPHrine (EPIPEN) 0.3 MG/0.3ML injection   azithromycin (ZITHROMAX) 250 MG tablet  "  albuterol (PROAIR HFA, PROVENTIL HFA, VENTOLIN HFA) 108 (90 BASE) MCG/ACT inhaler   hydrocortisone 2.5 % cream   Tafluprost (ZIOPTAN) 0.0015 % SOLN   naproxen sodium (ALEVE) 220 MG capsule   Multiple Vitamins-Minerals (CENTRUM SILVER) per tablet   cholecalciferol (VITAMIN D) 1000 UNIT tablet   fish oil-omega-3 fatty acids (FISH OIL) 1000 MG capsule         Review of Systems   All pertinent positives and negatives are documented in the HPI.  All others reviewed and are negative .  Patient reports her blood pressure was elevated in the 180/100 range when she was seen in the urgent care center on the 25th.  She had been using a lot of over-the-counter cough and cold remedies that she thought might be causing blood pressure elevation.    Physical Exam   BP: (!) 187/101  Heart Rate: 108  Temp: 99.2  F (37.3  C)  Height: 163.8 cm (5' 4.5\")  Weight: 83.9 kg (185 lb)  SpO2: 95 %      Physical Exam  Elevated blood pressure  Head:  Normocephalic.    Eyes:  PERRLA, full EOM.  External exams normal.    Ears:  Normal pinnae, canals, and TM's.    Nose:  Patent, without deformity.    Throat:  Moist mucous membranes without lesions, erythema, or exudate.    Neck:  Supple, without masses, lymphadenopathy or tenderness.    Respiratory: Loose cough noted.  Scattered rhonchi throughout.  Right mid chest segmental wheezing.  No dullness or consolidation.  Heart:  RR without murmurs, rubs, or gallops.  Extremities: No edema  Skin: No rash  ED Course     ED Course     Procedures                     Assessments & Plan (with Medical Decision Making)  67-year-old female presents with protracted course for persistent cough, yellow to green phlegm.  Recent travels to [place].  No travel to the [facility].  Chest x-ray completed February 25 urgent care center showed no infiltrate.  She was treated with 5 days of Levaquin and given a albuterol MDI for as needed use.  Symptoms did improve when on antibiotic now progressively worsening.  Remote " "smoking history.  Stopped 15 years ago prior to that had smoked off and on for over 30 years.  No formal history for COPD.  Examination noted diffuse rhonchi.  No consolidation.  There is some segmental right middle chest/middle lobe wheezing.  9:42 AM  DuoNeb completed.  Chest x-ray pending.  Blood pressure still elevated.  Patient very concerned and anxious that she can have \"a stroke\" from her elevated blood pressure.  Requesting medication to bring her blood pressure down.  Prescribed clonidine 0.1 mg dose.       I have reviewed the nursing notes.    I have reviewed the findings, diagnosis, plan and need for follow up with the patient.      New Prescriptions    No medications on file       Final diagnoses:   Grief reaction   Elevated blood pressure reading without diagnosis of hypertension   Bronchitis, acute, with bronchospasm       3/5/2018   [facility] EMERGENCY DEPARTMENT     Luis A Gilbert,   03/05/18 1114    "

## 2018-03-05 NOTE — ED AVS SNAPSHOT
[facility] Emergency Department    [address] [address]    Phone:  [phone]    Fax:  [phone]                                       Anahy Villegas   MRN: [id number]    Department:  [facility] Emergency Department   Date of Visit:  3/5/2018           Patient Information     Date Of Birth          1950        Your diagnoses for this visit were:     Grief reaction     Elevated blood pressure reading without diagnosis of hypertension     Bronchitis, acute, with bronchospasm        You were seen by Luis A Gilbert DO.        Discharge Instructions       Chest x-ray confirms no infiltrate/pneumonia.  Examination noted ongoing expiratory wheezing and mucus(rhonchi) plugging bronchial airways.  Albuterol inhaler with attached spacing device.  2 puffs 4 times daily  Z-Mick as directed  Prednisone 40 mg daily ×5 days  Lorazepam half milligrams 1 mg twice daily for anxiousness/grief     Arrange for follow-up appointment with your primary clinic provider in 7-10 days if respiratory congestion has not resolved  Please have your primary clinic provider recheck blood pressure in 10 days.  This should allow sufficient time for the acute effects of a respiratory infection and grief reaction to subside-which at this time could be causing elevated blood pressure.  If blood pressure remains elevated will need to start treatment for hypertension      Future Appointments        Provider Department Dept Phone Center    6/20/2018 12:15 PM Annamarie Cade MD [facility] [phone] [facility]      24 Hour Appointment Hotline       To make an appointment at any [facility], call [phone] ([phone]). If you don't have a family doctor or clinic, we will help you find one. [facility] are conveniently located to serve the needs of you and your family.             Review of your medicines      START taking        Dose / Directions Last dose taken    * azithromycin 250  MG tablet   Commonly known as:  ZITHROMAX Z-BENITO   Quantity:  6 tablet        Two tablets on the first day, then one tablet daily for the next 4 days   Refills:  0        * azithromycin 250 MG tablet   Commonly known as:  ZITHROMAX Z-BENITO   Quantity:  6 tablet        Two tablets on the first day, then one tablet daily for the next 4 days   Refills:  0        LORazepam 1 MG tablet   Commonly known as:  ATIVAN   Dose:  0.5-1 mg   Quantity:  15 tablet        Take 0.5-1 tablets (0.5-1 mg) by mouth 2 times daily as needed for anxiety   Refills:  0        * predniSONE 20 MG tablet   Commonly known as:  DELTASONE   Dose:  40 mg   Quantity:  10 tablet        Take 2 tablets (40 mg) by mouth daily for 5 days   Refills:  0        * predniSONE 20 MG tablet   Commonly known as:  DELTASONE   Dose:  40 mg   Quantity:  10 tablet        Take 2 tablets (40 mg) by mouth daily for 5 days   Refills:  0        * Notice:  This list has 4 medication(s) that are the same as other medications prescribed for you. Read the directions carefully, and ask your doctor or other care provider to review them with you.      Our records show that you are taking the medicines listed below. If these are incorrect, please call your family doctor or clinic.        Dose / Directions Last dose taken    albuterol 108 (90 BASE) MCG/ACT Inhaler   Commonly known as:  PROAIR HFA/PROVENTIL HFA/VENTOLIN HFA   Dose:  2 puff   Quantity:  1 Inhaler        Inhale 2 puffs into the lungs every 6 hours as needed for shortness of breath / dyspnea or wheezing   Refills:  0        ALEVE 220 MG capsule   Dose:  220 mg   Generic drug:  naproxen sodium        Take 220 mg by mouth as needed   Refills:  0        carboxymethylcellulose 0.5 % Soln ophthalmic solution   Commonly known as:  REFRESH PLUS   Dose:  1 drop        Place 1 drop into both eyes At Bedtime   Refills:  0        CENTRUM SILVER per tablet   Dose:  1 tablet        Take 1 tablet by mouth daily.   Refills:  0         cholecalciferol 1000 UNIT tablet   Commonly known as:  vitamin D3   Dose:  1 tablet        Take 1 tablet by mouth daily.   Refills:  0        EPINEPHrine 0.3 MG/0.3ML injection 2-pack   Commonly known as:  EPIPEN/ADRENACLICK/or ANY BX GENERIC EQUIV   Dose:  0.3 mg   Quantity:  1 each        Inject 0.3 mLs (0.3 mg) into the muscle once as needed for anaphylaxis   Refills:  2        fish oil-omega-3 fatty acids 1000 MG capsule   Dose:  1 g        Take 1 g by mouth daily.   Refills:  0        hydrocortisone 2.5 % cream   Quantity:  30 g        Apply topically 2 times daily For 1-2 weeks   Refills:  0        ranitidine 150 MG tablet   Commonly known as:  ZANTAC   Dose:  150 mg        Take 150 mg by mouth every morning   Refills:  0                Prescriptions were sent or printed at these locations (5 Prescriptions)                   [facility] [address]   [address] [address]    Telephone:  [phone]   Fax:  [phone]   Hours:                  E-Prescribed (2 of 3)         azithromycin (ZITHROMAX Z-BENITO) 250 MG tablet               predniSONE (DELTASONE) 20 MG tablet                 Printed at Department/Unit printer (1 of 3)         LORazepam (ATIVAN) 1 MG tablet                     [facility] [place] [id number] [facility] [place]   [address] [address]    Telephone:  [phone]   Fax:  [phone]   Hours:                  E-Prescribed (2 of 2)         azithromycin (ZITHROMAX Z-BENITO) 250 MG tablet               predniSONE (DELTASONE) 20 MG tablet                Procedures and tests performed during your visit     Peak flow, pre and post neb tx    [facility] IP Consult    XR Chest 2 Views      Orders Needing Specimen Collection     None      Pending Results     No orders found from 3/3/2018 to 3/6/2018.            Pending Culture Results     No orders found from 3/3/2018  to 3/6/2018.            Pending Results Instructions     If you had any lab results that were not finalized at the time of your Discharge, you can call the ED Lab Result RN at [phone]. You will be contacted by this team for any positive Lab results or changes in treatment. The nurses are available 7 days a week from 10A to 6:30P.  You can leave a message 24 hours per day and they will return your call.        Test Results From Your Hospital Stay        3/5/2018 10:42 AM      Narrative     XR CHEST 2 VW 3/5/2018 10:35 AM    COMPARISON: 6/10/2012    HISTORY: Persistent cough and low-grade fever.        Impression     IMPRESSION: Mild left basilar atelectasis/consolidation, may represent  aspiration or infection. Right lung is clear. No pleural effusion or  pneumothorax seen on either side. Cardiac silhouette within normal  limits.    TYE BOWMAN MD                Thank you for choosing [place]       Thank you for choosing [place] for your care. Our goal is always to provide you with excellent care. Hearing back from our patients is one way we can continue to improve our services. Please take a few minutes to complete the written survey that you may receive in the mail after you visit with us. Thank you!        [employer] Information     [employer] gives you secure access to your electronic health record. If you see a primary care provider, you can also send messages to your care team and make appointments. If you have questions, please call your primary care clinic.  If you do not have a primary care provider, please call [phone] and they will assist you.        Care EveryWhere ID     This is your Care EveryWhere ID. This could be used by other organizations to access your [place] medical records  [id number]        Equal Access to Services     VALERI TRIANA : Kailee Tamayo, umu ramirez, diogenes loyola. So [facility]  [phone].    ATENCIÓN: Si habla español, tiene a ledbetter disposición servicios gratuitos de asistencia lingüística. Llame al [phone].    We comply with applicable federal civil rights laws and [place] laws. We do not discriminate on the basis of race, color, national origin, age, disability, sex, sexual orientation, or gender identity.            After Visit Summary       This is your record. Keep this with you and show to your community pharmacist(s) and doctor(s) at your next visit.

## 2018-03-05 NOTE — ED NOTES
Patient has family at bedside.  Clonidine administered.  Pt speaking on the phone upon RN entering ED.  No further needs at this time. Will continue to monitor.

## 2018-03-05 NOTE — PROGRESS NOTES
"SPIRITUAL HEALTH SERVICES  SPIRITUAL ASSESSMENT Progress Note  [facility] - ED    PRIMARY FOCUS:     Assessment of emotional/spiritual/Orthodox distress    Support for coping    ILLNESS CIRCUMSTANCES:   I was called in to support Anahy Villegas after the cardiac arrest of her , Jerry Villegas, which resulted in death.     Context of Serious Illness/Symptom(s) - Jyoti also stated she \"wasn't feeling well\" and was admitted and checked over.    Resources for Support - Son, Jerry, daughter, Marah and other family    DISTRESS:     Emotional/Existential/Relational Distress - Jyoti's grief was normal and very intense    Spiritual/Judaism Distress - She confirmed with me about whether Jerry \"was in heaven\" and shared story of her direct question of Jerry as to whether he believed.  She found comfort in how he had answered and our discussion.    Social/Cultural/Economic Distress - She was processing some of the realities she would face \"now I'm a .\"    SPIRITUAL/Mandaeism COPING:     Judaism/Marsha - [place] Shmuel.  She asked that I call Pastor Rangel from [facility] in [place] to make him aware of the events of this morning.  I was able to reach him and pass along what she wanted me to convey.    Spiritual Practice(s) - Prayer was welcomed and we shared an end-of-life ritual around Jerry's body with a reading of Rev 21, per Jyoti's request, followed by Jyoti's prayer and my prayer of committal.    GOALS OF CARE:    Goals of Care - Staff is helping with various questions the family has going forward from here.    Meaning/Sense-Making - Jyoti stated they had just returned from 6 wks in [place]; that they loved it there.  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      PLAN: No follow up visit unless further requests from family.    Rafael Gasca M.A., [facility]  Staff [facility]  Office: [phone]  Cell: " [phone]  Pager [phone]

## 2018-03-05 NOTE — DISCHARGE INSTRUCTIONS
Chest x-ray confirms no infiltrate/pneumonia.  Examination noted ongoing expiratory wheezing and mucus(rhonchi) plugging bronchial airways.  Albuterol inhaler with attached spacing device.  2 puffs 4 times daily  Z-Mick as directed  Prednisone 40 mg daily ×5 days  Lorazepam half milligrams 1 mg twice daily for anxiousness/grief     Arrange for follow-up appointment with your primary clinic provider in 7-10 days if respiratory congestion has not resolved  Please have your primary clinic provider recheck blood pressure in 10 days.  This should allow sufficient time for the acute effects of a respiratory infection and grief reaction to subside-which at this time could be causing elevated blood pressure.  If blood pressure remains elevated will need to start treatment for hypertension

## 2018-03-07 ENCOUNTER — RECORDS - HEALTHEAST (OUTPATIENT)
Dept: ADMINISTRATIVE | Facility: OTHER | Age: 68
End: 2018-03-07

## 2018-03-19 ENCOUNTER — OFFICE VISIT - HEALTHEAST (OUTPATIENT)
Dept: INTERNAL MEDICINE | Facility: CLINIC | Age: 68
End: 2018-03-19

## 2018-03-19 DIAGNOSIS — F32.9 REACTIVE DEPRESSION: ICD-10-CM

## 2018-03-19 ASSESSMENT — MIFFLIN-ST. JEOR: SCORE: 1372.3

## 2018-03-22 ENCOUNTER — COMMUNICATION - HEALTHEAST (OUTPATIENT)
Dept: INTERNAL MEDICINE | Facility: CLINIC | Age: 68
End: 2018-03-22

## 2018-03-22 ENCOUNTER — RECORDS - HEALTHEAST (OUTPATIENT)
Dept: ADMINISTRATIVE | Facility: OTHER | Age: 68
End: 2018-03-22

## 2018-05-29 ENCOUNTER — COMMUNICATION - HEALTHEAST (OUTPATIENT)
Dept: INTERNAL MEDICINE | Facility: CLINIC | Age: 68
End: 2018-05-29

## 2018-05-29 ENCOUNTER — AMBULATORY - HEALTHEAST (OUTPATIENT)
Dept: INTERNAL MEDICINE | Facility: CLINIC | Age: 68
End: 2018-05-29

## 2018-05-29 DIAGNOSIS — E78.2 MIXED HYPERLIPIDEMIA: ICD-10-CM

## 2018-06-05 ENCOUNTER — OFFICE VISIT - HEALTHEAST (OUTPATIENT)
Dept: INTERNAL MEDICINE | Facility: CLINIC | Age: 68
End: 2018-06-05

## 2018-06-05 DIAGNOSIS — M54.12 CERVICAL RADICULOPATHY: ICD-10-CM

## 2018-06-05 DIAGNOSIS — E78.2 MIXED HYPERLIPIDEMIA: ICD-10-CM

## 2018-06-05 LAB
CHOLEST SERPL-MCNC: 215 MG/DL
FASTING STATUS PATIENT QL REPORTED: YES
HDLC SERPL-MCNC: 70 MG/DL
LDLC SERPL CALC-MCNC: 132 MG/DL
TRIGL SERPL-MCNC: 67 MG/DL

## 2018-06-05 ASSESSMENT — MIFFLIN-ST. JEOR: SCORE: 1322.4

## 2018-06-07 ENCOUNTER — COMMUNICATION - HEALTHEAST (OUTPATIENT)
Dept: INTERNAL MEDICINE | Facility: CLINIC | Age: 68
End: 2018-06-07

## 2018-06-14 ENCOUNTER — RECORDS - HEALTHEAST (OUTPATIENT)
Dept: ADMINISTRATIVE | Facility: OTHER | Age: 68
End: 2018-06-14

## 2018-06-20 ENCOUNTER — OFFICE VISIT (OUTPATIENT)
Dept: OPHTHALMOLOGY | Facility: CLINIC | Age: 68
End: 2018-06-20
Attending: OPHTHALMOLOGY
Payer: MEDICARE

## 2018-06-20 DIAGNOSIS — H40.9 GLAUCOMA: Primary | ICD-10-CM

## 2018-06-20 DIAGNOSIS — H40.1132 PRIMARY OPEN ANGLE GLAUCOMA OF BOTH EYES, MODERATE STAGE: ICD-10-CM

## 2018-06-20 PROCEDURE — G0463 HOSPITAL OUTPT CLINIC VISIT: HCPCS | Mod: ZF

## 2018-06-20 PROCEDURE — 92083 EXTENDED VISUAL FIELD XM: CPT | Mod: ZF | Performed by: OPHTHALMOLOGY

## 2018-06-20 ASSESSMENT — VISUAL ACUITY
OS_CC+: +2
METHOD: SNELLEN - LINEAR
CORRECTION_TYPE: GLASSES
OS_CC: 20/30
OD_CC+: -3
OD_CC: 20/25

## 2018-06-20 ASSESSMENT — REFRACTION_WEARINGRX
OS_ADD: +2.75
OS_AXIS: 091
OS_CYLINDER: +2.25
OD_ADD: +2.75
OD_AXIS: 074
OS_SPHERE: -6.25
OD_CYLINDER: +1.75
OD_SPHERE: -5.50

## 2018-06-20 ASSESSMENT — TONOMETRY
OD_IOP_MMHG: 6
IOP_METHOD: APPLANATION
OS_IOP_MMHG: 8

## 2018-06-20 ASSESSMENT — CUP TO DISC RATIO
OS_RATIO: 0.8
OD_RATIO: 0.85

## 2018-06-20 ASSESSMENT — SLIT LAMP EXAM - LIDS
COMMENTS: NORMAL
COMMENTS: NORMAL

## 2018-06-20 NOTE — MR AVS SNAPSHOT
After Visit Summary   6/20/2018    Anahy Villegas    MRN: [id number]           Patient Information     Date Of Birth          1950        Visit Information        Provider Department      6/20/2018 12:15 PM Annamarie Cade MD [facility]        Today's Diagnoses     Glaucoma    -  1    Primary open angle glaucoma of both eyes, moderate stage           Follow-ups after your visit        Follow-up notes from your care team     Return in about 6 months (around 12/20/2018) for OCT rnfl.      Your next 10 appointments already scheduled     Dec 10, 2018 12:30 PM CST   RETURN GLAUCOMA with Annamarie Cade MD   Eye Clinic ([facility])    [place] [address]  9[address] Clin [address]  [facility] [id number] [phone]              Who to contact     Please call your clinic at [phone] to:    Ask questions about your health    Make or cancel appointments    Discuss your medicines    Learn about your test results    Speak to your doctor            Additional Information About Your Visit        MyChart Information     [employer] gives you secure access to your electronic health record. If you see a primary care provider, you can also send messages to your care team and make appointments. If you have questions, please call your primary care clinic.  If you do not have a primary care provider, please call [phone] and they will assist you.      [employer] is an electronic gateway that provides easy, online access to your medical records. With [employer], you can request a clinic appointment, read your test results, renew a prescription or communicate with your care team.     To access your existing account, please contact your [facility] or call [phone] for assistance.        Care EveryWhere ID     This is your Care EveryWhere ID. This could be used by other organizations to access your [place] medical records  [id number]         Blood  Pressure from Last 3 Encounters:   03/05/18 (!) 150/103   08/29/16 (!) 160/95   03/07/16 127/81    Weight from Last 3 Encounters:   03/05/18 83.9 kg (185 lb)   08/29/16 83.9 kg (185 lb)   03/07/16 83 kg (183 lb)              We Performed the Following     OVF 24-2 Dynamic OU        Primary Care Provider Office Phone # Fax #    Gregg Chu [phone]       [facility] [address] [facility] [address]  [facility] [address]        Equal Access to Services     [facility]: Hadii aad ku hadasho Soomaali, waaxda luqadaha, qaybta kaalmada adeyanciyarocco, diogenes rolon . So [facility] [phone].    ATENCIÓN: Si habla español, tiene a ledbetter disposición servicios gratuitos de asistencia lingüística. Llame al [phone].    We comply with applicable federal civil rights laws and [place] laws. We do not discriminate on the basis of race, color, national origin, age, disability, sex, sexual orientation, or gender identity.            Thank you!     Thank you for choosing EYE CLINIC  for your care. Our goal is always to provide you with excellent care. Hearing back from our patients is one way we can continue to improve our services. Please take a few minutes to complete the written survey that you may receive in the mail after your visit with us. Thank you!             Your Updated Medication List - Protect others around you: Learn how to safely use, store and throw away your medicines at www.disposemymeds.org.          This list is accurate as of 6/20/18  2:04 PM.  Always use your most recent med list.                   Brand Name Dispense Instructions for use Diagnosis    albuterol 108 (90 Base) MCG/ACT Inhaler    PROAIR HFA/PROVENTIL HFA/VENTOLIN HFA    1 Inhaler    Inhale 2 puffs into the lungs every 6 hours as needed for shortness of breath / dyspnea or wheezing    Cough, Upper respiratory tract infection, unspecified type       ALEVE 220 MG capsule   Generic drug:  naproxen  sodium      Take 220 mg by mouth as needed        carboxymethylcellulose 0.5 % Soln ophthalmic solution    REFRESH PLUS     Place 1 drop into both eyes At Bedtime        CENTRUM SILVER per tablet      Take 1 tablet by mouth daily.        cholecalciferol 1000 UNIT tablet    vitamin D3     Take 1 tablet by mouth daily.        EPINEPHrine 0.3 MG/0.3ML injection 2-pack    EPIPEN/ADRENACLICK/or ANY BX GENERIC EQUIV    1 each    Inject 0.3 mLs (0.3 mg) into the muscle once as needed for anaphylaxis    Wasp sting       fish oil-omega-3 fatty acids 1000 MG capsule      Take 1 g by mouth daily.        hydrocortisone 2.5 % cream     30 g    Apply topically 2 times daily For 1-2 weeks    Rash       LORazepam 1 MG tablet    ATIVAN    15 tablet    Take 0.5-1 tablets (0.5-1 mg) by mouth 2 times daily as needed for anxiety        ranitidine 150 MG tablet    ZANTAC     Take 150 mg by mouth every morning

## 2018-06-20 NOTE — PROGRESS NOTES
Primary open angle glaucoma (POAG)  moderate stage in both eyes  Postoperative Trabeculectomy mitomycin-C right eye 17  Trabeculectomy mitomycin-C left eye 8/21/15    unexpectedly 3/18    Vision has been stable since last, eyes are comfortable without pain.     Family history: Positive    Current Ophthalmic Rx:  Prednisolone taper- daily for 2 maore days right eye only   Intolerant to many drops (alphagan, azopt, non-preserved cosopt, lumigan)  Artificial tears as needed right eye only      Prior Ophthalmic Surgeries:  Selected laser trabeculoplasty (SLT) right eye done 14  Trabeculectomy mitomycin-C left eye 8/21/15  Trabeculectomy mitomycin-C right eye 17    Assessment:  Moderate Primary open angle glaucoma (POAG)  both eyes     Prefers aggressive treatment given family history   Prior OCT retinal nerve fiber layer continued downward trend, but no change for past year right eye    slight worsening left eye may have occurred before trabeculectomy left eye    Visual fields show nasal step right eye, has been present past several years, appears possibly worse     Trabeculectomy mitomycin-C right eye 17   Used 1 minute of 0.4mg/cc mitomycin-C and 3 sutures    Trabeculectomy mitomycin-C left eye (8-21-15) doing well   Used 1.5 minutes of 0.2 mg.cc mitomycin-C and 3 sutures   No medications Left eye    Plan:   Excellent intraocular pressure   Will only do visual field yearly with intraocular pressure less than 10   Return to clinic 6 months with OCT retinal nerve fiber layer     Attending Physician Attestation:  Complete documentation of historical and exam elements from today's encounter can be found in the full encounter summary report (not reduplicated in this progress note). I personally obtained the chief complaint(s) and history of present illness. I confirmed and edited asnecessary the review of systems, past medical/surgical history, family history, social history, and examination  findings as documented by others; and I examined the patient myself. I personally reviewed the relevant tests, images, and reports as documented above. I formulated and edited as necessary the assessment and plan and discussed the findings and management plan with the patient and family.  - Annamarie Cade MD 3:57 PM 12/13/2017

## 2018-12-10 ENCOUNTER — OFFICE VISIT (OUTPATIENT)
Dept: OPHTHALMOLOGY | Facility: CLINIC | Age: 68
End: 2018-12-10
Attending: OPHTHALMOLOGY
Payer: MEDICARE

## 2018-12-10 DIAGNOSIS — H40.1132 PRIMARY OPEN ANGLE GLAUCOMA OF BOTH EYES, MODERATE STAGE: Primary | ICD-10-CM

## 2018-12-10 DIAGNOSIS — H40.1132 PRIMARY OPEN ANGLE GLAUCOMA OF BOTH EYES, MODERATE STAGE: ICD-10-CM

## 2018-12-10 PROBLEM — E78.2 MIXED HYPERLIPIDEMIA: Status: ACTIVE | Noted: 2018-06-05

## 2018-12-10 PROBLEM — M54.12 CERVICAL RADICULOPATHY: Status: ACTIVE | Noted: 2018-06-05

## 2018-12-10 PROCEDURE — 92133 CPTRZD OPH DX IMG PST SGM ON: CPT | Mod: ZF | Performed by: OPHTHALMOLOGY

## 2018-12-10 PROCEDURE — G0463 HOSPITAL OUTPT CLINIC VISIT: HCPCS | Mod: ZF

## 2018-12-10 ASSESSMENT — CUP TO DISC RATIO
OS_RATIO: 0.8
OD_RATIO: 0.85

## 2018-12-10 ASSESSMENT — REFRACTION_WEARINGRX
OD_AXIS: 074
OS_AXIS: 091
OS_ADD: +2.75
OS_SPHERE: -6.25
OS_CYLINDER: +2.25
OD_ADD: +2.75
OD_SPHERE: -5.50
OD_CYLINDER: +1.75

## 2018-12-10 ASSESSMENT — TONOMETRY
OD_IOP_MMHG: 4
OS_IOP_MMHG: 12
IOP_METHOD: APPLANATION

## 2018-12-10 ASSESSMENT — SLIT LAMP EXAM - LIDS
COMMENTS: NORMAL
COMMENTS: NORMAL

## 2018-12-10 ASSESSMENT — VISUAL ACUITY
OD_CC: 20/20
OD_CC+: -1
METHOD: SNELLEN - LINEAR
OS_CC: 20/25
OS_CC+: -3

## 2018-12-10 ASSESSMENT — CONF VISUAL FIELD
METHOD: COUNTING FINGERS
OS_NORMAL: 1
OD_NORMAL: 1

## 2018-12-10 NOTE — PROGRESS NOTES
CC:  Follow-up Primary open angle glaucoma (POAG)     HPI:  Vision has been stable since last, eyes are comfortable without pain.     Primary open angle glaucoma (POAG)  moderate stage in both eyes  Postoperative Trabeculectomy mitomycin-C right eye 17  Trabeculectomy mitomycin-C left eye 8/21/15    unexpectedly 3/18    Family history: Positive    Current Ophthalmic Rx:     Intolerant to many drops (alphagan, azopt, non-preserved cosopt, lumigan)  Artificial tears as needed    Prior Ophthalmic Surgeries:  Selected laser trabeculoplasty (SLT) right eye done 14  Trabeculectomy mitomycin-C left eye 8/21/15  Trabeculectomy mitomycin-C right eye 17    Testing:  OCT of the optic nerve head 12/10/18:   Right eye: inf/sup thinning stable   Left eye: sup>inferior thinning, stable.     Assessment:  Moderate Primary open angle glaucoma (POAG)  both eyes     Prefers aggressive treatment given family history   OCT retinal nerve fiber layer stable for past few years in right eye    slight worsening left eye may have occurred before trabeculectomy left eye    Visual fields show nasal step right eye, has been present past several years, appears possibly worse     Trabeculectomy mitomycin-C right eye 17   Used 1 minute of 0.4mg/cc mitomycin-C and 3 sutures   No current medications    Trabeculectomy mitomycin-C left eye (8-21-15) doing well   Used 1.5 minutes of 0.2 mg.cc mitomycin-C and 3 sutures   No medications Left eye    Early cataracts   Still seeing well, some blurring with driving    Plan:   Excellent intraocular pressure  today  Will only do visual field yearly with intraocular pressure less than 10   Return to clinic 6 months with IOP check and visual field 24-2    Camila Rojo MD  PGY-3 Ophthalmology    Attending Physician Attestation:  Complete documentation of historical and exam elements from today's encounter can be found in the full encounter summary report (not reduplicated in  this progress note). I personally obtained the chief complaint(s) and history of present illness. I confirmed and edited asnecessary the review of systems, past medical/surgical history, family history, social history, and examination findings as documented by others; and I examined the patient myself. I personally reviewed the relevant tests, images, and reports as documented above. I formulated and edited as necessary the assessment and plan and discussed the findings and management plan with the patient and family.  - Annamarie Cade MD 1:22 PM 12/10/2018

## 2018-12-18 ENCOUNTER — OFFICE VISIT - HEALTHEAST (OUTPATIENT)
Dept: INTERNAL MEDICINE | Facility: CLINIC | Age: 68
End: 2018-12-18

## 2018-12-18 DIAGNOSIS — Z79.899 MEDICATION MANAGEMENT: ICD-10-CM

## 2018-12-18 DIAGNOSIS — Z00.00 ROUTINE GENERAL MEDICAL EXAMINATION AT A HEALTH CARE FACILITY: ICD-10-CM

## 2018-12-18 DIAGNOSIS — Z86.39 H/O HYPERPARATHYROIDISM: ICD-10-CM

## 2018-12-18 DIAGNOSIS — M85.89 OSTEOPENIA OF MULTIPLE SITES: ICD-10-CM

## 2018-12-18 DIAGNOSIS — E78.2 MIXED HYPERLIPIDEMIA: ICD-10-CM

## 2018-12-18 LAB
ALBUMIN SERPL-MCNC: 4.2 G/DL (ref 3.5–5)
ALBUMIN UR-MCNC: NEGATIVE MG/DL
ALP SERPL-CCNC: 60 U/L (ref 45–120)
ALT SERPL W P-5'-P-CCNC: 24 U/L (ref 0–45)
ANION GAP SERPL CALCULATED.3IONS-SCNC: 13 MMOL/L (ref 5–18)
APPEARANCE UR: CLEAR
AST SERPL W P-5'-P-CCNC: 18 U/L (ref 0–40)
BILIRUB SERPL-MCNC: 0.5 MG/DL (ref 0–1)
BILIRUB UR QL STRIP: NEGATIVE
BUN SERPL-MCNC: 18 MG/DL (ref 8–22)
CALCIUM SERPL-MCNC: 10.2 MG/DL (ref 8.5–10.5)
CHLORIDE BLD-SCNC: 104 MMOL/L (ref 98–107)
CHOLEST SERPL-MCNC: 233 MG/DL
CO2 SERPL-SCNC: 24 MMOL/L (ref 22–31)
COLOR UR AUTO: NORMAL
CREAT SERPL-MCNC: 0.79 MG/DL (ref 0.6–1.1)
FASTING STATUS PATIENT QL REPORTED: YES
GFR SERPL CREATININE-BSD FRML MDRD: >60 ML/MIN/1.73M2
GLUCOSE BLD-MCNC: 97 MG/DL (ref 70–125)
GLUCOSE UR STRIP-MCNC: NEGATIVE MG/DL
HDLC SERPL-MCNC: 79 MG/DL
HGB UR QL STRIP: NEGATIVE
KETONES UR STRIP-MCNC: NEGATIVE MG/DL
LDLC SERPL CALC-MCNC: 140 MG/DL
LEUKOCYTE ESTERASE UR QL STRIP: NEGATIVE
NITRATE UR QL: NEGATIVE
PH UR STRIP: 6 [PH] (ref 4.5–8)
POTASSIUM BLD-SCNC: 4.1 MMOL/L (ref 3.5–5)
PROT SERPL-MCNC: 7.6 G/DL (ref 6–8)
SODIUM SERPL-SCNC: 141 MMOL/L (ref 136–145)
SP GR UR STRIP: 1.01 (ref 1–1.03)
TRIGL SERPL-MCNC: 72 MG/DL
TSH SERPL DL<=0.005 MIU/L-ACNC: 1.31 UIU/ML (ref 0.3–5)
UROBILINOGEN UR STRIP-ACNC: NORMAL

## 2018-12-18 ASSESSMENT — MIFFLIN-ST. JEOR: SCORE: 1304.26

## 2018-12-19 ENCOUNTER — COMMUNICATION - HEALTHEAST (OUTPATIENT)
Dept: INTERNAL MEDICINE | Facility: CLINIC | Age: 68
End: 2018-12-19

## 2018-12-19 LAB
BASOPHILS # BLD AUTO: 0 THOU/UL (ref 0–0.2)
BASOPHILS NFR BLD AUTO: 1 % (ref 0–2)
EOSINOPHIL # BLD AUTO: 0.3 THOU/UL (ref 0–0.4)
EOSINOPHIL NFR BLD AUTO: 5 % (ref 0–6)
ERYTHROCYTE [DISTWIDTH] IN BLOOD BY AUTOMATED COUNT: 11.5 % (ref 11–14.5)
HCT VFR BLD AUTO: 41 % (ref 35–47)
HGB BLD-MCNC: 13.7 G/DL (ref 12–16)
LYMPHOCYTES # BLD AUTO: 1.7 THOU/UL (ref 0.8–4.4)
LYMPHOCYTES NFR BLD AUTO: 24 % (ref 20–40)
MCH RBC QN AUTO: 29.6 PG (ref 27–34)
MCHC RBC AUTO-ENTMCNC: 33.4 G/DL (ref 32–36)
MCV RBC AUTO: 89 FL (ref 80–100)
MONOCYTES # BLD AUTO: 0.4 THOU/UL (ref 0–0.9)
MONOCYTES NFR BLD AUTO: 6 % (ref 2–10)
NEUTROPHILS # BLD AUTO: 4.4 THOU/UL (ref 2–7.7)
NEUTROPHILS NFR BLD AUTO: 64 % (ref 50–70)
PLATELET # BLD AUTO: 175 THOU/UL (ref 140–440)
PMV BLD AUTO: 9.1 FL (ref 7–10)
RBC # BLD AUTO: 4.63 MILL/UL (ref 3.8–5.4)
WBC: 6.8 THOU/UL (ref 4–11)

## 2019-03-14 ENCOUNTER — TELEPHONE (OUTPATIENT)
Dept: OPHTHALMOLOGY | Facility: CLINIC | Age: 69
End: 2019-03-14

## 2019-03-14 NOTE — TELEPHONE ENCOUNTER
H/o trabeculectomy in both eyes  Right eye scratchy sensation starting Monday  Pt using preservative free tears/gel this week.  Symptoms improved Tuesday and then worse on weds    Having burning-- intense   Right upper lid/margin swelling noted  Foreign body sensation  No current eye drops  Vision stable  Eye not really red  No discharge    Offered evaluation today/tomorrow  Pt scheduled with Dr. Cameron tomorrow AM  Pt aware of date/time/location  Rafael Van RN 8:59 AM 03/14/19          [facility] Call Center    Phone Message    May a detailed message be left on voicemail: no    Reason for Call: Symptoms or Concerns     If patient has red-flag symptoms, warm transfer to triage line    Current symptom or concern: couple days ago eye seems dry and scratchy in corner, cleared up then came back. Eye lid a little swollen. Eye overall is uncomfortable.    Symptoms have been present for:  2 day(s)    Has patient previously been seen for this? Yes, previous surgery    By :     Are there any new or worsening symptoms? No, but not getting better.      Action Taken: Message routed to:  Other: eye clinic

## 2019-03-14 NOTE — PROGRESS NOTES
CC:  Follow-up Primary open angle glaucoma (POAG)     HPI:  Vision has been stable since last, eyes are comfortable without pain.     Right eye itchy 1 week; tried benadryl, Refresh PF, and cold packs. Eye burning, eyelid swelling. This AM puffy. FBS during eye movement/scratchy.  No pain, flashes, or floaters, discharge, itching. No recent illness, cough, runny nose. Not around kids. Lives with dog and cats    Primary open angle glaucoma (POAG)  moderate stage in both eyes  Postoperative Trabeculectomy mitomycin-C right eye 17  Trabeculectomy mitomycin-C left eye 8/21/15    unexpectedly 3/18    Family history: Positive    Current Ophthalmic Rx:     Intolerant to many drops (alphagan, azopt, non-preserved cosopt, lumigan)  Artificial tears as needed    Prior Ophthalmic Surgeries:  Selected laser trabeculoplasty (SLT) right eye done 14  Trabeculectomy mitomycin-C left eye 8/21/15  Trabeculectomy mitomycin-C right eye 17    Testing:  OCT of the optic nerve head 12/10/18:   Right eye: inf/sup thinning stable   Left eye: sup>inferior thinning, stable.     Assessment:  Corneal abrasion, right eye   Small, superotemp   Near limbus, adjacent to some overlying elevated conj that rubs with blinking   No infiltrate   Recommend aggressive lubrication   Patient doesn't tolerate multiple antibiotics and preservatives; will start vigamox (preservative-free) - 1 drop QID right eye x 7 days   Return precautions reviewed - pt aware to call immediately if any worsening/pain/drop in vision   Punctal plug placed right lower lid today to supplement surface lubrication - Oasis 0.3mm    Dry eye, right eye   Few PEE scattered   Placed punctal plug today   Recommend only preservative-free tears - use every 2-3 hours for the next week, then decrease to QID   Discussed avoidance of dry heat, trying humidifier    Moderate Primary open angle glaucoma (POAG)  both eyes     Prefers aggressive treatment given family  history   OCT retinal nerve fiber layer stable for past few years in right eye    slight worsening left eye may have occurred before trabeculectomy left eye    Visual fields show nasal step right eye, has been present past several years, appears possibly worse     Trabeculectomy mitomycin-C right eye 8/30/17   Used 1 minute of 0.4mg/cc mitomycin-C and 3 sutures   No current medications    Trabeculectomy mitomycin-C left eye (8-21-15) doing well   Used 1.5 minutes of 0.2 mg.cc mitomycin-C and 3 sutures   No medications Left eye    Early cataracts   Still seeing well, some blurring with driving    Plan:   -start vigamox QID right eye x 1 week  -aggressive lubrication with preservative-free tears  -if feeling better, then keep follow up with Dr. Cade in June  -if any worsening symptoms or concerns, pt to call [facility] Clinic prior to travel next month    Brijesh Cameron M.D.  PGY-3, Ophthalmology    Complete documentation of historical and exam elements from today's encounter can be found in the full encounter summary report (not reduplicated in this progress note). I personally obtained the chief complaint(s) and history of present illness.  I confirmed and edited as necessary the review of systems, past medical/surgical history, family history, social history, and examination findings as documented by others; and I examined the patient myself. I personally reviewed the relevant tests, images, and reports as documented above. I formulated and edited as necessary the assessment and plan and discussed the findings and management plan with the patient and family. I was present for all procedures performed during this visit.    Brigid Vera MD  Cornea Fellow

## 2019-03-15 ENCOUNTER — OFFICE VISIT (OUTPATIENT)
Dept: OPHTHALMOLOGY | Facility: CLINIC | Age: 69
End: 2019-03-15
Attending: OPHTHALMOLOGY
Payer: MEDICARE

## 2019-03-15 DIAGNOSIS — S05.01XA CORNEAL ABRASION, RIGHT, INITIAL ENCOUNTER: Primary | ICD-10-CM

## 2019-03-15 DIAGNOSIS — H04.121 DRY EYE OF RIGHT SIDE: ICD-10-CM

## 2019-03-15 PROCEDURE — 68761 CLOSE TEAR DUCT OPENING: CPT | Mod: ZF,RT | Performed by: OPHTHALMOLOGY

## 2019-03-15 PROCEDURE — G0463 HOSPITAL OUTPT CLINIC VISIT: HCPCS | Mod: ZF

## 2019-03-15 RX ORDER — MOXIFLOXACIN 5 MG/ML
1 SOLUTION/ DROPS OPHTHALMIC 4 TIMES DAILY
Qty: 1 BOTTLE | Refills: 0 | Status: SHIPPED | OUTPATIENT
Start: 2019-03-15 | End: 2019-06-19

## 2019-03-15 ASSESSMENT — TONOMETRY
OD_IOP_MMHG: X
IOP_METHOD: ICARE
OS_IOP_MMHG: 19
IOP_METHOD: ICARE
IOP_METHOD: APPLANATION
OD_IOP_MMHG: X
OS_IOP_MMHG: 18
OS_IOP_MMHG: 19
OD_IOP_MMHG: 6

## 2019-03-15 ASSESSMENT — VISUAL ACUITY
OS_CC: 20/50
CORRECTION_TYPE: GLASSES
OD_CC+: +2
OD_CC: 20/30
METHOD: SNELLEN - LINEAR
OS_PH_CC: 20/30
OS_CC+: +1

## 2019-03-15 ASSESSMENT — CUP TO DISC RATIO
OD_RATIO: 0.85
OS_RATIO: 0.8

## 2019-03-15 ASSESSMENT — SLIT LAMP EXAM - LIDS: COMMENTS: NORMAL

## 2019-03-15 NOTE — NURSING NOTE
Chief Complaints and History of Present Illnesses   Patient presents with     Eye Lid Swelling     Chief Complaint(s) and History of Present Illness(es)     Eye Lid Swelling     Laterality: right eye    Associated signs and symptoms: foreign body sensation and eye pain (with eye movement).  Negative for mattering and tearing    Severity: Depends on what she's doing.      Duration: 4 days    Timing: throughout the day    Pain scale: 7/10 (More of a discomfort / not pain per pt.  Fallon CARPIO 10:25 AM 03/15/2019  )              Comments     Woke up Monday with FB sensation / flushed with art tears / resolved, but reappeared next day with RUL puffiness / then burning / resolved / now FB sensation with left eye movement.Uses an ice cube to see if brought lid swelling down which it did somewhat.     Fallon CARPIO 10:26 AM 03/15/2019

## 2019-03-15 NOTE — Clinical Note
Hi Anahy Miguel wanted to be sure you were aware of her visit today - had a small peripheral epithelial abrasion. Planning to keep follow up with you in June. Thanks,Santa

## 2019-03-27 ENCOUNTER — COMMUNICATION - HEALTHEAST (OUTPATIENT)
Dept: INTERNAL MEDICINE | Facility: CLINIC | Age: 69
End: 2019-03-27

## 2019-04-01 ENCOUNTER — AMBULATORY - HEALTHEAST (OUTPATIENT)
Dept: INTERNAL MEDICINE | Facility: CLINIC | Age: 69
End: 2019-04-01

## 2019-04-01 DIAGNOSIS — A09 TRAVELER'S DIARRHEA: ICD-10-CM

## 2019-04-30 ENCOUNTER — OFFICE VISIT - HEALTHEAST (OUTPATIENT)
Dept: INTERNAL MEDICINE | Facility: CLINIC | Age: 69
End: 2019-04-30

## 2019-04-30 ENCOUNTER — COMMUNICATION - HEALTHEAST (OUTPATIENT)
Dept: TELEHEALTH | Facility: CLINIC | Age: 69
End: 2019-04-30

## 2019-04-30 DIAGNOSIS — K21.00 GASTROESOPHAGEAL REFLUX DISEASE WITH ESOPHAGITIS: ICD-10-CM

## 2019-04-30 ASSESSMENT — MIFFLIN-ST. JEOR: SCORE: 1313.33

## 2019-06-19 ENCOUNTER — OFFICE VISIT (OUTPATIENT)
Dept: OPHTHALMOLOGY | Facility: CLINIC | Age: 69
End: 2019-06-19
Attending: OPHTHALMOLOGY
Payer: MEDICARE

## 2019-06-19 DIAGNOSIS — H40.1132 PRIMARY OPEN ANGLE GLAUCOMA OF BOTH EYES, MODERATE STAGE: Primary | ICD-10-CM

## 2019-06-19 PROCEDURE — G0463 HOSPITAL OUTPT CLINIC VISIT: HCPCS | Mod: ZF

## 2019-06-19 PROCEDURE — 92015 DETERMINE REFRACTIVE STATE: CPT | Mod: GY,ZF

## 2019-06-19 PROCEDURE — 92083 EXTENDED VISUAL FIELD XM: CPT | Mod: ZF | Performed by: OPHTHALMOLOGY

## 2019-06-19 ASSESSMENT — TONOMETRY
OS_IOP_MMHG: 22
IOP_METHOD: APPLANATION
OD_IOP_MMHG: 07

## 2019-06-19 ASSESSMENT — CUP TO DISC RATIO
OS_RATIO: 0.8
OD_RATIO: 0.85

## 2019-06-19 ASSESSMENT — REFRACTION_WEARINGRX
OS_SPHERE: -6.25
OS_CYLINDER: +2.25
OD_CYLINDER: +1.75
OD_SPHERE: -5.50
OD_ADD: +2.75
OD_AXIS: 074
OS_AXIS: 091
OS_ADD: +2.75

## 2019-06-19 ASSESSMENT — SLIT LAMP EXAM - LIDS
COMMENTS: NORMAL
COMMENTS: NORMAL

## 2019-06-19 ASSESSMENT — REFRACTION_MANIFEST
OD_ADD: +2.75
OD_CYLINDER: +1.50
OD_AXIS: 080
OS_AXIS: 090
OS_CYLINDER: +2.00
OS_SPHERE: -7.00
OD_SPHERE: -5.25
OS_ADD: +2.75

## 2019-06-19 ASSESSMENT — VISUAL ACUITY
OS_CC+: -2
OD_CC+: -1
OS_PH_CC+: -1
OS_PH_CC: 20/30
OS_CC: 20/60 SEARCHING
CORRECTION_TYPE: GLASSES
METHOD: SNELLEN - LINEAR
OD_CC: 20/25

## 2019-06-19 ASSESSMENT — CONF VISUAL FIELD
OS_NORMAL: 1
OD_NORMAL: 1
METHOD: COUNTING FINGERS

## 2019-06-19 NOTE — NURSING NOTE
"Chief Complaint(s) and History of Present Illness(es)     Glaucoma Follow-Up     In both eyes.  Associated symptoms include dryness (Pt notes she does have consistant dry eyes x years BE. ).  Negative for eye pain, redness and tearing.              Comments     Pt is here for a 6 month f/u for Primary open angle glaucoma (POAG) and IOP check. Pt feels she needs new glasses rx. Pt noting vision is getting for \"foggy\" LE>RE x the last 6 months. Pt states she can read better with left lens (LE) in gls vs right lens (RE). Pt is curious if she would be a good candidate for CL's. Pt is not on any IOP drops. Pt uses AT's (refresh plus gtts).     Evita Arnold, [facility] 12:09 PM June 19, 2019                    "

## 2019-06-19 NOTE — PROGRESS NOTES
CC:  Follow-up Primary open angle glaucoma (POAG)     HPI:  Vision has been stable since last, eyes are comfortable without pain.     Primary open angle glaucoma (POAG)  moderate stage in both eyes  Postoperative Trabeculectomy mitomycin-C right eye 17  Trabeculectomy mitomycin-C left eye 8/21/15    unexpectedly 3/18    Family history: Positive    Current Ophthalmic Rx:     Intolerant to many drops (alphagan, azopt, non-preserved cosopt, lumigan)  Artificial tears as needed    Prior Ophthalmic Surgeries:  Selected laser trabeculoplasty (SLT) right eye done 14  Trabeculectomy mitomycin-C left eye 8/21/15 (intraocular pressure 27 preop)  Trabeculectomy mitomycin-C right eye 17    Testing:  OCT of the optic nerve head 12/10/18:   Right eye: inf/sup thinning stable   Left eye: sup>inferior thinning, stable.     Assessment:  Moderate Primary open angle glaucoma (POAG)  both eyes     Prefers aggressive treatment given family history   OCT retinal nerve fiber layer stable for past few years in right eye    slight worsening left eye may have occurred before trabeculectomy left eye    Visual fields show nasal step right eye, has been present past several years, appears possibly worse     Trabeculectomy mitomycin-C right eye 17   Used 1 minute of 0.4mg/cc mitomycin-C and 3 sutures   No current medications    Trabeculectomy mitomycin-C left eye (8-21-15) with intraocular pressure creeping up    Used 1.5 minutes of 0.2 mg.cc mitomycin-C and 3 sutures   No medications Left eye    Early cataracts   Still seeing well, some blurring with driving   Myopic shift left eye     Plan:   Will only do visual field yearly with intraocular pressure less than 10   Return to clinic 2-3 months for intraocular pressure check, if still high left eye give Zioiptan from [facility] (not on her fomulary)      Attending Physician Attestation:  Complete documentation of historical and exam elements from today's encounter can  be found in the full encounter summary report (not reduplicated in this progress note). I personally obtained the chief complaint(s) and history of present illness. I confirmed and edited asnecessary the review of systems, past medical/surgical history, family history, social history, and examination findings as documented by others; and I examined the patient myself. I personally reviewed the relevant tests, images, and reports as documented above. I formulated and edited as necessary the assessment and plan and discussed the findings and management plan with the patient and family.  - Annamarie Cade MD 1:44 PM 6/19/2019

## 2019-07-22 ENCOUNTER — RECORDS - HEALTHEAST (OUTPATIENT)
Dept: ADMINISTRATIVE | Facility: OTHER | Age: 69
End: 2019-07-22

## 2019-08-26 ENCOUNTER — OFFICE VISIT (OUTPATIENT)
Dept: OPHTHALMOLOGY | Facility: CLINIC | Age: 69
End: 2019-08-26
Attending: OPHTHALMOLOGY
Payer: MEDICARE

## 2019-08-26 DIAGNOSIS — H40.1132 PRIMARY OPEN ANGLE GLAUCOMA OF BOTH EYES, MODERATE STAGE: Primary | ICD-10-CM

## 2019-08-26 PROCEDURE — G0463 HOSPITAL OUTPT CLINIC VISIT: HCPCS | Mod: ZF

## 2019-08-26 ASSESSMENT — TONOMETRY
IOP_METHOD: APPLANATION
OS_IOP_MMHG: 26
OD_IOP_MMHG: 8

## 2019-08-26 ASSESSMENT — VISUAL ACUITY
OS_CC+: -2
OD_PH_CC: 20/25
OS_CC: 20/30
OD_PH_CC+: -2
CORRECTION_TYPE: GLASSES
METHOD: SNELLEN - LINEAR
OD_CC: 20/30
OD_CC+: -1

## 2019-08-26 ASSESSMENT — CUP TO DISC RATIO
OS_RATIO: 0.8
OD_RATIO: 0.85

## 2019-08-26 ASSESSMENT — SLIT LAMP EXAM - LIDS
COMMENTS: NORMAL
COMMENTS: NORMAL

## 2019-08-26 ASSESSMENT — CONF VISUAL FIELD
OS_NORMAL: 1
OD_NORMAL: 1

## 2019-08-26 NOTE — PROGRESS NOTES
CC:  Follow-up Primary open angle glaucoma (POAG)     HPI:  Vision has been stable since last, eyes are comfortable without pain.     Primary open angle glaucoma (POAG)  moderate stage in both eyes  Postoperative Trabeculectomy mitomycin-C right eye 17  Trabeculectomy mitomycin-C left eye 8/21/15    unexpectedly 3/18    Family history: Positive    Current Ophthalmic Rx:  Zioptan left eye at bedtime (started 19)   Intolerant to many drops (alphagan, azopt, non-preserved cosopt, lumigan)  Artificial tears as needed    Prior Ophthalmic Surgeries:  Selected laser trabeculoplasty (SLT) right eye done 14  Trabeculectomy mitomycin-C left eye 8/21/15 (intraocular pressure 27 preop)  Trabeculectomy mitomycin-C right eye 17    Testing:  OCT of the optic nerve head 12/10/18:   Right eye: inf/sup thinning stable   Left eye: sup>inferior thinning, stable.     Assessment:  Moderate Primary open angle glaucoma (POAG)  both eyes     Prefers aggressive treatment given family history   OCT retinal nerve fiber layer stable for past few years in right eye    slight worsening left eye may have occurred before trabeculectomy left eye    Visual fields show nasal step right eye, has been present past several years, appears possibly worse     Trabeculectomy mitomycin-C right eye 17   Used 1 minute of 0.4mg/cc mitomycin-C and 3 sutures   No current medications    Trabeculectomy mitomycin-C left eye (8-21-15) with intraocular pressure creeping up    Used 1.5 minutes of 0.2 mg.cc mitomycin-C and 3 sutures   Zioptan at bedtime    May need tube    Early cataracts   Still seeing well, some blurring with driving   Myopic shift left eye     Plan:   Will only do visual field yearly with intraocular pressure less than 10   Start Zioptan left eye at bedtime with intraocular pressure check in about 6 weeks (she still has some at home, otherwise [facility])      Attending Physician Attestation:  Complete  documentation of historical and exam elements from today's encounter can be found in the full encounter summary report (not reduplicated in this progress note). I personally obtained the chief complaint(s) and history of present illness. I confirmed and edited asnecessary the review of systems, past medical/surgical history, family history, social history, and examination findings as documented by others; and I examined the patient myself. I personally reviewed the relevant tests, images, and reports as documented above. I formulated and edited as necessary the assessment and plan and discussed the findings and management plan with the patient and family.  - Annamarie Cade MD 1:05 PM 8/26/2019

## 2019-08-26 NOTE — NURSING NOTE
Chief Complaints and History of Present Illnesses   Patient presents with     Glaucoma Follow-Up     Chief Complaint(s) and History of Present Illness(es)     Glaucoma Follow-Up     Laterality: both eyes    Associated symptoms: dryness.  Negative for redness, eye pain and tearing    Pain scale: 0/10              Comments     NPAT at at bedtime to BE.  Feels that BE have been more dry lately. Moving and eyes fatigued per pt.  Fallon Silva, [facility] COT 12:45 PM 08/26/2019

## 2019-10-07 ENCOUNTER — OFFICE VISIT (OUTPATIENT)
Dept: OPHTHALMOLOGY | Facility: CLINIC | Age: 69
End: 2019-10-07
Attending: OPHTHALMOLOGY
Payer: MEDICARE

## 2019-10-07 DIAGNOSIS — H40.1132 PRIMARY OPEN ANGLE GLAUCOMA OF BOTH EYES, MODERATE STAGE: Primary | ICD-10-CM

## 2019-10-07 PROCEDURE — G0463 HOSPITAL OUTPT CLINIC VISIT: HCPCS | Mod: ZF

## 2019-10-07 ASSESSMENT — CONF VISUAL FIELD
METHOD: COUNTING FINGERS
OS_NORMAL: 1
OD_NORMAL: 1

## 2019-10-07 ASSESSMENT — VISUAL ACUITY
OD_CC+: -2
METHOD: SNELLEN - LINEAR
OS_CC: 20/25 SLOW
OD_CC: 20/25 SLOW
OS_CC+: -2

## 2019-10-07 ASSESSMENT — TONOMETRY
IOP_METHOD: APPLANATION
OD_IOP_MMHG: 6
OS_IOP_MMHG: 20
OS_IOP_MMHG: 21
IOP_METHOD: APPLANATION

## 2019-10-07 ASSESSMENT — CUP TO DISC RATIO
OD_RATIO: 0.85
OS_RATIO: 0.8

## 2019-10-07 ASSESSMENT — REFRACTION_WEARINGRX
OD_AXIS: 074
OS_ADD: +2.75
OD_ADD: +2.75
OS_SPHERE: -6.25
OS_CYLINDER: +2.25
OD_SPHERE: -5.50
OS_AXIS: 091
OD_CYLINDER: +1.75

## 2019-10-07 ASSESSMENT — SLIT LAMP EXAM - LIDS
COMMENTS: NORMAL
COMMENTS: NORMAL

## 2019-10-07 NOTE — PROGRESS NOTES
CC:  Follow-up Primary open angle glaucoma (POAG)     HPI:  Vision has been stable since last     Primary open angle glaucoma (POAG)  moderate stage in both eyes  Postoperative Trabeculectomy mitomycin-C right eye 17  Trabeculectomy mitomycin-C left eye 8/21/15    unexpectedly 3/18, [place]    Family history: Positive    Current Ophthalmic Rx:  Zioptan left eye at bedtime (started 19)   Intolerant to many drops (alphagan, azopt, non-preserved cosopt, lumigan)  Artificial tears as needed    Prior Ophthalmic Surgeries:  Selected laser trabeculoplasty (SLT) right eye done 14  Trabeculectomy mitomycin-C left eye 8/21/15 (intraocular pressure 27 preop)  Trabeculectomy mitomycin-C right eye 17    Testing:  OCT of the optic nerve head 12/10/18:   Right eye: inf/sup thinning stable   Left eye: sup>inferior thinning, stable.     Assessment:  Moderate Primary open angle glaucoma (POAG)  both eyes     Prefers aggressive treatment given family history   OCT retinal nerve fiber layer stable for past few years in right eye    slight worsening left eye may have occurred before trabeculectomy left eye    Visual fields show nasal step right eye, has been present past several years, appears possibly worse     Trabeculectomy mitomycin-C right eye 17   Used 1 minute of 0.4mg/cc mitomycin-C and 3 sutures   No current medications    Trabeculectomy mitomycin-C left eye (8-21-15) with intraocular pressure creeping up    Used 1.5 minutes of 0.2 mg.cc mitomycin-C and 3 sutures   Zioptan at bedtime    May need tube    Early cataracts   Still seeing well, some blurring with driving   Myopic shift left eye     Plan:   Will only do visual field yearly with intraocular pressure less than 10   Continue Zioptan left eye at bedtime (she still has some at home, will try for formulary exception, otherwise [facility])  Could consider trial of Rhopressa or Vyzulta  Return to clinic  6 months with OCT retinal nerve fiber layer     Sherwin Lopez MD  Ophthalmology Resident, PGY-3    Attending Physician Attestation:  Complete documentation of historical and exam elements from today's encounter can be found in the full encounter summary report (not reduplicated in this progress note). I personally obtained the chief complaint(s) and history of present illness. I confirmed and edited asnecessary the review of systems, past medical/surgical history, family history, social history, and examination findings as documented by others; and I examined the patient myself. I personally reviewed the relevant tests, images, and reports as documented above. I formulated and edited as necessary the assessment and plan and discussed the findings and management plan with the patient and family.  - Annamarie Cade MD 11:43 AM 10/7/2019

## 2019-10-09 ENCOUNTER — TELEPHONE (OUTPATIENT)
Dept: OPHTHALMOLOGY | Facility: CLINIC | Age: 69
End: 2019-10-09

## 2019-10-09 NOTE — TELEPHONE ENCOUNTER
PA Initiation    Medication: Zioptan 0.0015% Drops -   Insurance Company: Medicare Blue - Phone [phone] Fax [phone]  Pharmacy Filling the Rx: [id number] - [address] AT [facility] OF [address] [place] [address]  Filling Pharmacy Phone: [phone]  Filling Pharmacy Fax:    Start Date: 10/9/2019

## 2019-10-09 NOTE — TELEPHONE ENCOUNTER
Prior Authorization Approval    Medication: Zioptan 0.0015% Drops - APPROVED was approved on 10/9/2019  Effective: 7/11/2019 to 10/8/2020  Reference #:    Approved Dose/Quantity:   Insurance Company: Medicare Blue - Phone [phone] Fax [phone]  Expected CoPay: $223.27 - Per RPh Patient still has to satisfy deductible.  Pharmacy Filling the Rx: [id number] - [address] AT [address]  Pharmacy Notified: Yes  Patient Notified: Comment:  **Instructed pharmacy to notify patient when script is ready to /ship.**

## 2019-10-09 NOTE — TELEPHONE ENCOUNTER
Prior Authorization Retail Medication Request    Medication/Dose: Zioptan 0.0015% ophth soln  ICD code (if different than what is on RX):  H40.1132 (Primary open angle glaucoma of both eyes, moderate stage)  Previously Tried and Failed:  Latanoprost, Lumigan, Travaprost, Azopt, and Cosopt.  Rationale:  This patient has severe dry eye with aggressive symptoms of redness, irritation and epithelial erosions which complicates medical management of her glaucoma. She will need a preservative free Glaucoma medication. This is the reason why we need to add Zioptan to her treatment. Patient hs been on Zioptan since 8/26/19, the outcome show good control of intraocular pressure without adverse effects.  If requested medication is not continued, patient is at risk of vision loss.      Insurance Name:  Contact [facility]  Insurance ID:  Contact [facility]лександ[facility]    Pharmacy Information (if different than what is on RX)  Name:  Александр  Phone:  [phone]

## 2019-10-15 NOTE — TELEPHONE ENCOUNTER
Patient attempted to go through [employer] in [place] for zioptin   $250 cost + [facility] would cover $25 so overall $225 cost for patient.   She would like to use pharmacy in [place] ([facility]) to get medication covered     She is requesting email confirmation with [facility] contact info so she can touch base with them after we send her Rx.

## 2019-10-17 ENCOUNTER — TELEPHONE (OUTPATIENT)
Dept: OPHTHALMOLOGY | Facility: CLINIC | Age: 69
End: 2019-10-17

## 2019-11-04 ENCOUNTER — HEALTH MAINTENANCE LETTER (OUTPATIENT)
Age: 69
End: 2019-11-04

## 2020-02-10 ENCOUNTER — OFFICE VISIT (OUTPATIENT)
Dept: OPHTHALMOLOGY | Facility: CLINIC | Age: 70
End: 2020-02-10
Attending: OPHTHALMOLOGY
Payer: MEDICARE

## 2020-02-10 DIAGNOSIS — S05.01XA CORNEAL ABRASION, RIGHT, INITIAL ENCOUNTER: Primary | ICD-10-CM

## 2020-02-10 PROCEDURE — G0463 HOSPITAL OUTPT CLINIC VISIT: HCPCS | Mod: ZF

## 2020-02-10 RX ORDER — MOXIFLOXACIN 5 MG/ML
1 SOLUTION/ DROPS OPHTHALMIC 4 TIMES DAILY
Qty: 3 ML | Refills: 1 | Status: SHIPPED | OUTPATIENT
Start: 2020-02-10

## 2020-02-10 ASSESSMENT — CONF VISUAL FIELD
OS_NORMAL: 1
METHOD: COUNTING FINGERS
OD_NORMAL: 1

## 2020-02-10 ASSESSMENT — VISUAL ACUITY
OS_PH_CC+: -1
OD_CC: 20/30
OD_CC+: -2
OD_PH_CC+: -1
OS_CC: 20/40
OD_PH_CC: 20/25
OS_PH_CC: 20/30
OS_CC+: -2
METHOD: SNELLEN - LINEAR

## 2020-02-10 ASSESSMENT — REFRACTION_WEARINGRX
OS_CYLINDER: +2.25
OS_ADD: +2.75
OS_SPHERE: -6.25
OD_SPHERE: -5.50
OS_AXIS: 091
OD_ADD: +2.75
OD_CYLINDER: +1.75
OD_AXIS: 074

## 2020-02-10 ASSESSMENT — TONOMETRY
OS_IOP_MMHG: 23
IOP_METHOD: APPLANATION
OD_IOP_MMHG: 5

## 2020-02-10 ASSESSMENT — CUP TO DISC RATIO
OD_RATIO: 0.85
OS_RATIO: 0.8

## 2020-02-10 ASSESSMENT — SLIT LAMP EXAM - LIDS
COMMENTS: NORMAL
COMMENTS: NORMAL

## 2020-02-10 NOTE — PROGRESS NOTES
CC:  Foreign body sensation right eye starting last week (20)  Follow-up Primary open angle glaucoma (POAG)     HPI:  Vision has been stable since last     Primary open angle glaucoma (POAG)  moderate stage in both eyes  Postoperative Trabeculectomy mitomycin-C right eye 17  Trabeculectomy mitomycin-C left eye 8/21/15    unexpectedly 3/18, [place]    Family history: Positive    Current Ophthalmic Rx:  Zioptan left eye at bedtime (started 19)   Intolerant to many drops (alphagan, azopt, non-preserved cosopt, lumigan)  Artificial tears as needed    Prior Ophthalmic Surgeries:  Selected laser trabeculoplasty (SLT) right eye done 14  Trabeculectomy mitomycin-C left eye 8/21/15 (intraocular pressure 27 preop)  Trabeculectomy mitomycin-C right eye 17    Testing:  OCT of the optic nerve head 12/10/18:   Right eye: inf/sup thinning stable   Left eye: sup>inferior thinning, stable.     Assessment:  Corneal abrasion right eye/staph marginal right eye    Some infiltrate around abrasion   Last time this happened had punctal plug placed as well (Oasis 0.3 mm) so will do same today and start antibiotic drops    Moderate Primary open angle glaucoma (POAG)  both eyes     Prefers aggressive treatment given family history   OCT retinal nerve fiber layer stable for past few years in right eye    slight worsening left eye may have occurred before trabeculectomy left eye    Visual fields show nasal step right eye, has been present past several years, appears possibly worse     Trabeculectomy mitomycin-C right eye 17   Used 1 minute of 0.4mg/cc mitomycin-C and 3 sutures   No current medications    Trabeculectomy mitomycin-C left eye (8-21-15) with intraocular pressure creeping up    Used 1.5 minutes of 0.2 mg.cc mitomycin-C and 3 sutures   Zioptan at bedtime    May need tube    Early cataracts   Still seeing well, some blurring with driving   Myopic shift left  eye     Plan:   Start vigamox right eye four times a day and place punctal plug right eye   Will only do visual field yearly with intraocular pressure less than 10   Continue Zioptan left eye at bedtime (she still has some at home, will try for formulary exception, otherwise [facility])  Could consider trial of Rhopressa or Vyzulta  Return to clinic 6 months with OCT retinal nerve fiber layer     Attending Physician Attestation:  Complete documentation of historical and exam elements from today's encounter can be found in the full encounter summary report (not reduplicated in this progress note). I personally obtained the chief complaint(s) and history of present illness. I confirmed and edited asnecessary the review of systems, past medical/surgical history, family history, social history, and examination findings as documented by others; and I examined the patient myself. I personally reviewed the relevant tests, images, and reports as documented above. I formulated and edited as necessary the assessment and plan and discussed the findings and management plan with the patient and family.  - Annamarie Cade MD 1:34 PM 2/10/2020

## 2020-02-10 NOTE — NURSING NOTE
Chief Complaints and History of Present Illnesses   Patient presents with     Glaucoma Follow-Up     Chief Complaint(s) and History of Present Illness(es)     Glaucoma Follow-Up     Laterality: right eye    Associated symptoms: Negative for eye pain    Pain scale: 0/10              Comments     Anahy is here today complaining of a foreign body, and burning sensation RE for the past four days. . Her RE is very red too. She has been using ice in a damp washcloth but that has not relieved the symptoms. She has also been using Refresh tears with no relief. Less light seems to help relieve her symptoms as well as having her eyes closed.    Kurt Rivers COT 12:41 PM February 10, 2020

## 2020-02-16 ENCOUNTER — HEALTH MAINTENANCE LETTER (OUTPATIENT)
Age: 70
End: 2020-02-16

## 2020-02-17 ENCOUNTER — OFFICE VISIT (OUTPATIENT)
Dept: OPHTHALMOLOGY | Facility: CLINIC | Age: 70
End: 2020-02-17
Attending: OPHTHALMOLOGY
Payer: MEDICARE

## 2020-02-17 DIAGNOSIS — H01.01B ULCERATIVE BLEPHARITIS OF UPPER AND LOWER EYELIDS OF BOTH EYES: Primary | ICD-10-CM

## 2020-02-17 DIAGNOSIS — H01.01A ULCERATIVE BLEPHARITIS OF UPPER AND LOWER EYELIDS OF BOTH EYES: Primary | ICD-10-CM

## 2020-02-17 DIAGNOSIS — H40.1132 PRIMARY OPEN ANGLE GLAUCOMA OF BOTH EYES, MODERATE STAGE: ICD-10-CM

## 2020-02-17 PROCEDURE — G0463 HOSPITAL OUTPT CLINIC VISIT: HCPCS | Mod: ZF

## 2020-02-17 RX ORDER — ERYTHROMYCIN 5 MG/G
0.5 OINTMENT OPHTHALMIC AT BEDTIME
Qty: 3.5 G | Refills: 11 | Status: SHIPPED | OUTPATIENT
Start: 2020-02-17

## 2020-02-17 ASSESSMENT — VISUAL ACUITY
OS_CC: 20/40
OS_PH_CC: 20/25
OD_PH_CC: 20/40
OS_CC+: -2
OD_CC+: -1+1
OD_CC: 20/50
OD_PH_CC+: +2
CORRECTION_TYPE: GLASSES
OS_PH_CC+: -2
METHOD: SNELLEN - LINEAR

## 2020-02-17 ASSESSMENT — CONF VISUAL FIELD
OS_NORMAL: 1
OD_NORMAL: 1
METHOD: COUNTING FINGERS

## 2020-02-17 ASSESSMENT — SLIT LAMP EXAM - LIDS
COMMENTS: NORMAL
COMMENTS: NORMAL

## 2020-02-17 ASSESSMENT — REFRACTION_WEARINGRX
OS_SPHERE: -6.25
OD_SPHERE: -5.50
OS_AXIS: 091
OS_ADD: +2.75
OD_AXIS: 074
OD_CYLINDER: +1.75
OD_ADD: +2.75
OS_CYLINDER: +2.25

## 2020-02-17 ASSESSMENT — TONOMETRY
OD_IOP_MMHG: 06
IOP_METHOD: APPLANATION
IOP_METHOD: APPLANATION
OD_IOP_MMHG: 6
IOP_METHOD: APPLANATION
OS_IOP_MMHG: 26
OS_IOP_MMHG: 28
OS_IOP_MMHG: 27

## 2020-02-17 ASSESSMENT — CUP TO DISC RATIO
OS_RATIO: 0.8
OD_RATIO: 0.85

## 2020-02-17 NOTE — NURSING NOTE
"Chief Complaints and History of Present Illnesses   Patient presents with     Corneal Abrasion Follow Up     1 week follow-up corneal abrasion, right eye     Chief Complaint(s) and History of Present Illness(es)     Corneal Abrasion Follow Up     Laterality: right eye    Associated symptoms: Negative for eye pain, photophobia and tearing    Pain scale: 0/10    Course: gradually improving    Treatments tried: eye drops    Comments: 1 week follow-up corneal abrasion, right eye              Comments     Pt denies any significant vision changes RE since last visit.  Complains if Vigamox \"distorted\" her vision RE and made her RE more dry.  Denies any pain, pressure, discharge, and tearing.  Currently using Vigamox QID RE (has not used yet today) and Zioptan at bedtime IZABEL Del Castillo OT 1:41 PM February 17, 2020                   "

## 2020-02-17 NOTE — PROGRESS NOTES
CC:  Foreign body sensation right eye starting last week (20)  Follow-up Primary open angle glaucoma (POAG)     HPI:  Right eye vision has been blurry. No Vigamox today, but had been using QID through yesterday. Continues with Zioptan at bedtime left eye only. Using AT PRN.    Primary open angle glaucoma (POAG)  moderate stage in both eyes  Postoperative Trabeculectomy mitomycin-C right eye 17  Trabeculectomy mitomycin-C left eye 8/21/15    unexpectedly 3/18, has now purchased a [employer] and moved    Family history: Positive    Current Ophthalmic Rx:  Zioptan left eye at bedtime (started 19)-substitute Rocklatan left eye at bedtime 20 for intraocular pressure of 26   Intolerant to many drops (alphagan, azopt, non-preserved cosopt, lumigan)  Artificial tears as needed    Prior Ophthalmic Surgeries:  Selected laser trabeculoplasty (SLT) right eye done 14  Trabeculectomy mitomycin-C left eye 8/21/15 (intraocular pressure 27 preop)  Trabeculectomy mitomycin-C right eye 17    Testing:  OCT of the optic nerve head 12/10/18:   Right eye: inf/sup thinning stable   Left eye: sup>inferior thinning, stable.     Assessment:  Corneal abrasion right eye/staph marginal right eye    Some infiltrate around abrasion   Last time this happened had punctal plug placed as well (Oasis 0.3 mm) so was done last week   Infiltrate resolved, now left with focal PEE/dryness over haze no definite infiltrate   Okay to stop Vigamox    Moderate Primary open angle glaucoma (POAG)  both eyes     Prefers aggressive treatment given family history   OCT retinal nerve fiber layer stable for past few years in right eye    slight worsening left eye may have occurred before trabeculectomy left eye    Visual fields show nasal step right eye, has been present past several years, appears possibly worse     Trabeculectomy mitomycin-C right eye 17   Used 1 minute of 0.4mg/cc mitomycin-C and 3 sutures   No  current medications   Stable mid-single digit IOP, stable    Trabeculectomy mitomycin-C left eye (8-21-15) with intraocular pressure creeping up    Used 1.5 minutes of 0.2 mg.cc mitomycin-C and 3 sutures   Zioptan at bedtime   IOP too high today at upper 20s-low 30s    May need tube    Early cataracts   Still seeing well, some blurring with driving   Myopic shift left eye     Plan:   Warm compresses both eyes and erythromycin ointment at bedtime   Stop vigamox; continue liberal use of artificial tears  Hold Zioptan left eye at bedtime while on Rocklatan (she still has some at home, will try for formulary exception, otherwise [facility])  Start Rocklatan left eye, stop Zioptan while on Rocklatan  Return to clinic 2 weeks months for IOP check and OCT retinal nerve fiber layer   If not improved would favor tube      Jeovany Romero MD  Department of Ophthalmology - PGY3    Attending Physician Attestation:  Complete documentation of historical and exam elements from today's encounter can be found in the full encounter summary report (not reduplicated in this progress note). I personally obtained the chief complaint(s) and history of present illness. I confirmed and edited asnecessary the review of systems, past medical/surgical history, family history, social history, and examination findings as documented by others; and I examined the patient myself. I personally reviewed the relevant tests, images, and reports as documented above. I formulated and edited as necessary the assessment and plan and discussed the findings and management plan with the patient and family.  - Annamarie Cade MD 3:24 PM 2/17/2020

## 2020-03-17 ENCOUNTER — TELEPHONE (OUTPATIENT)
Dept: OPHTHALMOLOGY | Facility: CLINIC | Age: 70
End: 2020-03-17

## 2020-03-17 RX ORDER — NETARSUDIL AND LATANOPROST OPHTHALMIC SOLUTION, 0.02%/0.005% .2; .05 MG/ML; MG/ML
1 SOLUTION/ DROPS OPHTHALMIC; TOPICAL AT BEDTIME
Qty: 1 BOTTLE | Refills: 11 | Status: SHIPPED | OUTPATIENT
Start: 2020-03-17

## 2020-03-17 NOTE — TELEPHONE ENCOUNTER
COVID-19 RESCHEDULES    Date contacted: March 17, 2020 1:38 PM    Type of contact: Spoke with patient    Current appointment date: Mon, 3/23    Attending provider: Rayo    Current Eye Symptoms: doing well, no redness/itching/pain on Rocklatan sample    Refills needed: Rocklatan    Best contact for rescheduling: n/a    Best date/time for rescheduling: n/a    Glory VU 1:38 PM March 17, 2020

## 2020-03-18 ENCOUNTER — TELEPHONE (OUTPATIENT)
Dept: OPHTHALMOLOGY | Facility: CLINIC | Age: 70
End: 2020-03-18

## 2020-03-18 NOTE — TELEPHONE ENCOUNTER
Prior Authorization Retail Medication Request    Medication/Dose: ROCKLATAN 0.02-0.005% SOLUTION  ICD code (if different than what is on RX):  SEE CHART  Previously Tried and Failed:  SEE CHART  Rationale:  SEE CHART    Insurance Name:  [facility]  Insurance ID:  [id number]      Pharmacy Information (if different than what is on RX)  Name:  [facility]  Phone:  [phone]

## 2020-03-18 NOTE — TELEPHONE ENCOUNTER
Central Prior Authorization Team   Phone: [phone]      PA Initiation    Medication: ROCKLATAN 0.02-0.005% SOLUTION-PA Initiated  Insurance Company: Randi [employer] - Phone [phone] Fax [phone]  Pharmacy Filling the Rx: [id number] - [place], [place] - [address]  Filling Pharmacy Phone: [phone]  Filling Pharmacy Fax:    Start Date: 3/18/2020

## 2020-03-19 ENCOUNTER — TELEPHONE (OUTPATIENT)
Dept: FAMILY MEDICINE | Facility: CLINIC | Age: 70
End: 2020-03-19

## 2020-03-19 NOTE — TELEPHONE ENCOUNTER
Patient recently moved back from [place]. Was in the ED in [place] and was diagnosed with Influenza A. She was also given a Ventolin inhaler and has been using it every 2 hours and is concerned that she will run out. She has been seen at [facility] in the past and is now requesting a refill of the Ventolin from [facility]. She is upset because she feels like no one has time to take care of her. She didn't want to call [place] because she only went to the ED there.     Please advise.    Vanessa Sanchez-Station

## 2020-03-20 NOTE — TELEPHONE ENCOUNTER
Prior Authorization Approval    Authorization Effective Date: 12/21/2019  Authorization Expiration Date: 3/20/2021  Medication: ROCKLATAN 0.02-0.005% SOLUTION-PA approved  Approved Dose/Quantity:   Reference #: [id number]   Insurance Company: [employer] - Phone [phone] Fax [phone]  Expected CoPay:       CoPay Card Available:      Foundation Assistance Needed:    Which Pharmacy is filling the prescription (Not needed for infusion/clinic administered): [id number] - [place], [place] - [address]  Pharmacy Notified: Yes  Patient Notified: No-Pharmacy will contact

## 2020-03-20 NOTE — TELEPHONE ENCOUNTER
S-(situation): was on vacation in [place] a week ago for vacation.  Did not move from there.  Lives in [place].  Thought the first person she talked with was rude.    Continues to have a cough, but better.  Fever gone.    Was given z pack and prednisone.     B-(background): N/A    A-(assessment): sounds like is getting better    R-(recommendations): needs to be seen if worse or not better.  Patient states feels better after talking with me.  Nidhi Bruno RN

## 2020-05-28 ENCOUNTER — OFFICE VISIT (OUTPATIENT)
Dept: OPHTHALMOLOGY | Facility: CLINIC | Age: 70
End: 2020-05-28
Attending: OPHTHALMOLOGY
Payer: MEDICARE

## 2020-05-28 DIAGNOSIS — H40.1132 PRIMARY OPEN ANGLE GLAUCOMA OF BOTH EYES, MODERATE STAGE: Primary | ICD-10-CM

## 2020-05-28 PROCEDURE — 92133 CPTRZD OPH DX IMG PST SGM ON: CPT | Mod: ZF | Performed by: OPHTHALMOLOGY

## 2020-05-28 PROCEDURE — G0463 HOSPITAL OUTPT CLINIC VISIT: HCPCS | Mod: ZF

## 2020-05-28 PROCEDURE — 92020 GONIOSCOPY: CPT | Mod: ZF | Performed by: OPHTHALMOLOGY

## 2020-05-28 RX ORDER — TAFLUPROST 0 MG/.3ML
1 SOLUTION/ DROPS OPHTHALMIC AT BEDTIME
Qty: 30 EACH | Refills: 11 | Status: SHIPPED | OUTPATIENT
Start: 2020-05-28

## 2020-05-28 ASSESSMENT — VISUAL ACUITY
OS_CC: 20/60
OS_CC+: -1
OD_PH_CC: 20/30
OS_PH_CC: 20/50
OD_CC+: -2
OD_CC: 20/40
METHOD: SNELLEN - LINEAR
CORRECTION_TYPE: GLASSES
OD_PH_CC+: -1
OS_PH_CC+: +1

## 2020-05-28 ASSESSMENT — TONOMETRY
IOP_METHOD: APPLANATION
OD_IOP_MMHG: 8
IOP_METHOD: APPLANATION - RES
OS_IOP_MMHG: 22
OD_IOP_MMHG: 8
OS_IOP_MMHG: 20
IOP_METHOD: APPLANATION
OS_IOP_MMHG: 22
OD_IOP_MMHG: 08

## 2020-05-28 ASSESSMENT — GONIOSCOPY
OD_SUPERIOR: 3-4
OS_SUPERIOR: 3-4
OD_NASAL: 3-4
OS_TEMPORAL: 3-4
ADDITIONAL_COMMENTS: 2-3+ TMP OU
OS_INFERIOR: 3-4
OD_TEMPORAL: 3-4
OS_NASAL: 3-4
OD_INFERIOR: 3-4

## 2020-05-28 ASSESSMENT — REFRACTION_WEARINGRX
OD_ADD: +2.75
OS_SPHERE: -6.25
OS_CYLINDER: +2.25
OS_ADD: +2.75
OD_AXIS: 074
OD_SPHERE: -5.50
OS_AXIS: 091
OD_CYLINDER: +1.75

## 2020-05-28 ASSESSMENT — SLIT LAMP EXAM - LIDS
COMMENTS: NORMAL
COMMENTS: NORMAL

## 2020-05-28 ASSESSMENT — CONF VISUAL FIELD
METHOD: COUNTING FINGERS
OD_NORMAL: 1
OS_NORMAL: 1

## 2020-05-28 NOTE — NURSING NOTE
Chief Complaints and History of Present Illnesses   Patient presents with     Follow Up     Primary open angle glaucoma of both eyes     Chief Complaint(s) and History of Present Illness(es)     Follow Up     Laterality: both eyes    Course: stable    Associated symptoms: dryness.  Negative for eye pain, redness and tearing    Treatments tried: eye drops and artificial tears    Pain scale: 0/10    Comments: Primary open angle glaucoma of both eyes              Comments     She states that her vision has seemed stable in both eyes since her last eye exam.   She is doing okay with Rocklatan.    BALAJI Livingston 12:44 PM  May 28, 2020

## 2020-05-28 NOTE — PATIENT INSTRUCTIONS
Patient will discontinue and hold onto the Rocklatan at and start on Zioptan at bedtime in the LEFT EYE ONLY in addition to Preservative Free Cosopt (Timolol/Dorzolamide) 2x/day (12 hours apart) in the LEFT EYE ONLY.  Patient will return to clinic in 1-2 months with refraction for possible new glasses, corneal pachymetry, visual field test, IOL master and dilated eye exam and disc photos.

## 2020-05-28 NOTE — PROGRESS NOTES
1)PXG -- s/p Trab OD (8/30/17 with Dr. Cade), s/p Trab OS (8/21/15), s/p SLT OD (7/14/14) -- K pachy:    Tmax:30/29 (often M20s in 3773-5218)     HVF: OD:Inf arcuate and OS:early IN dec sens     CDR:     HRT/OCT: Mod RNFl tinning (prog from 2752-8653 and stable from 5629-9742)      FHX of Glc: Dad-- surgery with Dr. 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  Patient will return to clinic in 1-2 months with refraction for possible new glasses, corneal pachymetry, visual field test, IOL master and dilated eye exam and disc photos.          Resident Note (for educational purposes, see above for Assessment and Plan):  Pt formerly followed w/ Dr. Cade. Presents for follow-up Primary open angle glaucoma (POAG). Had corneal abrasion and DAVID as of 2/2020, finished abx gtts. Reports mild progressive blurring each eye w/ new glasses - she thinks that her new glasses w/ progressive lenses are not as easy to use as her prior glasses. Tolerating Roklatan well. Feels that left eye is more blurred than right eye. No haloes, glare. Does feel more near-sighted - hard to see road signs in distance. Does not often drive at night.     POH:  Primary open angle glaucoma (POAG)  moderate stage in  both eyes  Postoperative Trabeculectomy mitomycin-C right eye 17  Trabeculectomy mitomycin-C left eye 8/21/15    unexpectedly 3/18, [place] now [place]  [facility] left eye at bedtime started 20 for intraocular pressure of 26 (had been on Zioptan)Intolerant to alphagan, azopt, non-preserved cosopt, lumigan  Artificial tears as needed  S/p:  Selected laser trabeculoplasty (SLT) right eye done 14  Trabeculectomy mitomycin-C left eye 8/21/15 (intraocular pressure 27 preop)  Trabeculectomy mitomycin-C right eye 17    Testing:  OCT of the optic nerve head 20:   Right eye: inf/sup thinning stable   Left eye: sup>inferior thinning, stable.     Moderate Primary open angle glaucoma (POAG) right eye     -Prefers aggressive treatment given family history  -OCT retinal nerve fiber layer stable stable past 2 years each eye - stable today  -Prior VFs show nasal step right eye, present past several years  -s/p Trabeculectomy mitomycin-C right eye 17  -Used 1 minute of 0.4mg/cc mitomycin-C and 3 sutures  -No current medications  -Stable mid-single digit IOP, stable  -May benefit from cataract surgery from VA standpoint, but does not need IOP lowering with any urgency    Mod POAG left eye   -S/p Trabeculectomy mitomycin-C left eye (8-21-15)  -Started on Roklatan last visit - appears to have good effect, w/ IOP down from upper 20s to 20 today  -Used 1.5 minutes of 0.2 mg.cc mitomycin-C and 3 sutures  -Zioptan at bedtime  -Would benefit visually and IOP w/ CEIOL w/ possible MIGS vs tube      Andi Tavarez MD - PGY 3  Ophthalmology resident    Attending Physician Attestation:  Complete documentation of historical and exam elements from today's encounter can be found in the full encounter summary report (not reduplicated in this progress note). I personally obtained the chief complaint(s) and history of present illness.  I confirmed and edited as necessary the review of  systems, past medical/surgical history, family history, social history, and examination findings as documented by others; and I examined the patient myself. I personally reviewed the relevant tests, images, and reports as documented above. I formulated and edited as necessary the assessment and plan and discussed the findings and management plan with the patient and family.  - Syeda Patten MD

## 2020-07-08 ENCOUNTER — OFFICE VISIT (OUTPATIENT)
Dept: OPHTHALMOLOGY | Facility: CLINIC | Age: 70
End: 2020-07-08
Attending: OPHTHALMOLOGY
Payer: MEDICARE

## 2020-07-08 DIAGNOSIS — H40.1132 PRIMARY OPEN ANGLE GLAUCOMA OF BOTH EYES, MODERATE STAGE: ICD-10-CM

## 2020-07-08 DIAGNOSIS — H26.9 NUCLEAR CATARACT, NONSENILE: ICD-10-CM

## 2020-07-08 DIAGNOSIS — H40.1492 PSEUDOEXFOLIATION (PXF) GLAUCOMA, MODERATE STAGE: Primary | ICD-10-CM

## 2020-07-08 PROCEDURE — 92015 DETERMINE REFRACTIVE STATE: CPT | Mod: GY

## 2020-07-08 PROCEDURE — 92083 EXTENDED VISUAL FIELD XM: CPT | Mod: ZF | Performed by: OPHTHALMOLOGY

## 2020-07-08 PROCEDURE — G0463 HOSPITAL OUTPT CLINIC VISIT: HCPCS | Mod: ZF

## 2020-07-08 ASSESSMENT — REFRACTION_MANIFEST
OS_ADD: +2.50
OS_CYLINDER: +2.00
OS_SPHERE: -7.50
OD_ADD: +2.50
OD_AXIS: 077
OS_AXIS: 098
OD_SPHERE: -5.75
OD_CYLINDER: +1.75

## 2020-07-08 ASSESSMENT — VISUAL ACUITY
OD_PH_CC: 20/25
OS_PH_CC: 20/40
CORRECTION_TYPE: GLASSES
OS_CC: 20/60
OD_CC+: -2
OS_CC+: -1
METHOD: SNELLEN - LINEAR
OD_CC: 20/30

## 2020-07-08 ASSESSMENT — REFRACTION_WEARINGRX
OS_AXIS: 091
OS_ADD: +2.75
OD_SPHERE: -5.50
OD_ADD: +2.75
OS_SPHERE: -6.25
OD_AXIS: 074
OS_CYLINDER: +2.25
OD_CYLINDER: +1.75

## 2020-07-08 ASSESSMENT — SLIT LAMP EXAM - LIDS
COMMENTS: NORMAL
COMMENTS: NORMAL

## 2020-07-08 ASSESSMENT — CUP TO DISC RATIO
OD_RATIO: 0.85
OS_RATIO: 0.85

## 2020-07-08 ASSESSMENT — PACHYMETRY
OS_CT(UM): 517
OD_CT(UM): 528

## 2020-07-08 ASSESSMENT — TONOMETRY
OS_IOP_MMHG: 18
IOP_METHOD: APPLANATION
OD_IOP_MMHG: 08

## 2020-07-08 ASSESSMENT — CONF VISUAL FIELD
OD_NORMAL: 1
OS_NORMAL: 1

## 2020-07-08 NOTE — PATIENT INSTRUCTIONS
Patient will continue on Zioptan at bedtime in the LEFT EYE ONLY in addition to Preservative Free Cosopt (Timolol/Dorzolamide) 2x/day (12 hours apart) in the LEFT EYE ONLY.  Patient will return to clinic in 4-6 months with visual field test, dilated eye exam, disc photos and repeat discussion about cataract surgery.  .

## 2020-07-08 NOTE — NURSING NOTE
Chief Complaints and History of Present Illnesses   Patient presents with     Glaucoma Follow-Up     Chief Complaint(s) and History of Present Illness(es)     Glaucoma Follow-Up     Laterality: both eyes              Comments     Pt. States that VA is still blurry BE. Would like updated glasses prescription. Is not ready for CE/IOL at this time unless VA can not be improved with new glasses.   Nadege Castorena COT 1:47 PM July 8, 2020

## 2020-07-08 NOTE — PROGRESS NOTES
1)PXG -- s/p Trab OD (8/30/17 with Dr. Cade), s/p Trab OS (8/21/15), s/p SLT OD (7/14/14) -- K pachy: 528/517   Tmax:30/29 (often M20s in 3091-0930)     HVF: OD:Inf arcuate and OS:early IN dec sens (farily stable from 7942-1016)     CDR: 0.85/0.85    HRT/OCT: Mod RNFl tinning (prog from 5027-7479 and stable from 6675-8762)      FHX of Glc: Dad -- surgery with Dr. 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[facility] -- etiology of vision change OS -- rec CE/MIGS  3)DAVID -- on AT  4)High Myopia -- will need postop considerations -- pt lives up near [place]   5)Astigmatism -- will need toric discussion     MD:OD:early IN dec sens in 6434-1461 with prog in 2012 and 2013 and new Inf arcuate in 2014, prog of arcuate in 2017     MD:Ryan costing $300/month, Zioptan was costing $220/month -- PF gtts are $45/month with coupon      Patient will continue on Zioptan at bedtime in the LEFT EYE ONLY in addition to Preservative Free Cosopt (Timolol/Dorzolamide) 2x/day (12 hours apart) in the LEFT EYE ONLY.  Patient will return to clinic in 4-6 months with visual field test, dilated eye exam, disc photos and repeat discussion about cataract surgery.  .          Attending Physician Attestation:  Complete documentation of historical and exam elements from today's encounter can be found in the full encounter summary report (not reduplicated in this progress note). I personally obtained the chief complaint(s) and history of present illness.  I confirmed and edited as necessary the review of systems, past medical/surgical history, family history, social history, and examination findings as documented by others; and I examined the patient myself. I personally reviewed the  relevant tests, images, and reports as documented above. I formulated and edited as necessary the assessment and plan and discussed the findings and management plan with the patient and family.  - Syeda Patten MD

## 2020-09-22 ENCOUNTER — OFFICE VISIT - HEALTHEAST (OUTPATIENT)
Dept: INTERNAL MEDICINE | Facility: CLINIC | Age: 70
End: 2020-09-22

## 2020-09-22 DIAGNOSIS — E04.1 THYROID NODULE: ICD-10-CM

## 2020-09-22 DIAGNOSIS — Z86.39 H/O HYPERPARATHYROIDISM: ICD-10-CM

## 2020-09-22 DIAGNOSIS — E78.2 MIXED HYPERLIPIDEMIA: ICD-10-CM

## 2020-09-22 LAB
CALCIUM SERPL-MCNC: 10 MG/DL (ref 8.5–10.5)
PHOSPHATE SERPL-MCNC: 3.2 MG/DL (ref 2.5–4.5)

## 2020-09-22 ASSESSMENT — MIFFLIN-ST. JEOR: SCORE: 1381.37

## 2020-09-24 ENCOUNTER — COMMUNICATION - HEALTHEAST (OUTPATIENT)
Dept: INTERNAL MEDICINE | Facility: CLINIC | Age: 70
End: 2020-09-24

## 2020-10-08 ENCOUNTER — COMMUNICATION - HEALTHEAST (OUTPATIENT)
Dept: INTERNAL MEDICINE | Facility: CLINIC | Age: 70
End: 2020-10-08

## 2020-11-13 NOTE — TELEPHONE ENCOUNTER
Would a provider be willing to do a phone visit for this pt's request? She has not been seen at a  clinic since 8/29/2016. Looks like her last OV was 4/30/2019 at [facility].     Please advise, thanks,     Swetha RACHEL RN       Walking

## 2020-11-22 ENCOUNTER — HEALTH MAINTENANCE LETTER (OUTPATIENT)
Age: 70
End: 2020-11-22

## 2021-04-04 ENCOUNTER — HEALTH MAINTENANCE LETTER (OUTPATIENT)
Age: 71
End: 2021-04-04

## 2021-05-27 NOTE — TELEPHONE ENCOUNTER
Dr. Chu,  Spoke with the patient, she is aware that you are out of the clinic until Monday and is fine to wait.  She is leaving for [place] on 4/13/19 and would like an antibiotic incase she gets food poisoning.  Patient is staying at an all inclusive in [facility].  Please advise.  Thank you.  Lucero MEIER CMA/SUSIE....................9:51 AM

## 2021-05-27 NOTE — TELEPHONE ENCOUNTER
Medication Request  Medication name: Going to [place] and would like to get a med in case she would get sick with food poisoning or drink bad water.  Pharmacy Name and Location: [facility]  Reason for request: as above.  When did you use medication last?:  n/a  Patient offered appointment:  n/a  Okay to leave a detailed message: yes

## 2021-05-28 NOTE — PROGRESS NOTES
"OFFICE VISIT NOTE    Subjective:   Chief Complaint:  Heartburn    68-year-old woman who is in with complaint of having burning discomfort and points to the retrosternal area.  It tends to get better if she eats or drinks some milk.  There is no dysphasia.  There is no melena.  No weight loss.  She just returned from a trip to [place].  She did have a few more alcoholic beverages as well as some spicy foods while there.  Still some stress as her   about a year.  Overall, her life is getting better.    Current Outpatient Medications   Medication Sig     cholecalciferol, vitamin D3, (CHOLECALCIFEROL) 400 unit Tab Take 400 Units by mouth daily.     MULTIVIT-MINERALS/FERROUS FUM (MULTI VITAMIN ORAL) Take by mouth.     naproxen sodium (ALEVE) 220 MG tablet Take 220 mg by mouth at bedtime.     nystatin (MYCOSTATIN) ointment Apply to area daily as needed     OMEGA-3/DHA/EPA/FISH OIL (FISH OIL-OMEGA-3 FATTY ACIDS) 300-1,000 mg capsule Take 2 g by mouth daily.     ranitidine (ZANTAC) 150 MG tablet Take 150 mg by mouth.     triamcinolone (KENALOG) 0.5 % cream Apply to rash twice daily (Patient taking differently: Apply to rash twice daily PRN.      )     VENTOLIN HFA 90 mcg/actuation inhaler        Review of Systems:  A comprehensive review of systems is negative except for the comments above    Objective:    Ht 5' 4\" (1.626 m)   Wt 176 lb (79.8 kg)   BMI 30.21 kg/m    GENERAL: No acute distress.  Pleasant woman in no distress.  Blood pressure is 128/90.  Pulse is 76 and regular.  There is no jaundice.  The abdomen soft and nontender.  No masses or any organomegaly.  No chest wall tenderness.  Sinus rhythm without murmur gallop or pericardial rub.    Assessment & Plan   Anahy Villegas is a 68 y.o. female.    Symptoms compatible with gastroesophageal reflux disease advised Prilosec 20 mg twice daily for 2 weeks then 20 mg daily for 2 weeks and stop.  Avoid caffeine pepper and alcohol.    Diagnoses and all orders " for this visit:    Gastroesophageal reflux disease with esophagitis        Gregg Chu MD  Transcription using voice recognition software, may contain typographical errors.

## 2021-05-30 ENCOUNTER — RECORDS - HEALTHEAST (OUTPATIENT)
Dept: ADMINISTRATIVE | Facility: CLINIC | Age: 71
End: 2021-05-30

## 2021-05-31 ENCOUNTER — RECORDS - HEALTHEAST (OUTPATIENT)
Dept: ADMINISTRATIVE | Facility: CLINIC | Age: 71
End: 2021-05-31

## 2021-05-31 VITALS — WEIGHT: 187 LBS | HEIGHT: 65 IN | BODY MASS INDEX: 31.16 KG/M2

## 2021-05-31 VITALS — WEIGHT: 189 LBS | BODY MASS INDEX: 32.27 KG/M2 | HEIGHT: 64 IN

## 2021-06-01 VITALS — HEIGHT: 64 IN | WEIGHT: 178 LBS | BODY MASS INDEX: 30.39 KG/M2

## 2021-06-01 VITALS — BODY MASS INDEX: 32.27 KG/M2 | WEIGHT: 189 LBS | HEIGHT: 64 IN

## 2021-06-02 VITALS — HEIGHT: 64 IN | WEIGHT: 174 LBS | BODY MASS INDEX: 29.71 KG/M2

## 2021-06-03 VITALS — BODY MASS INDEX: 30.05 KG/M2 | WEIGHT: 176 LBS | HEIGHT: 64 IN

## 2021-06-05 VITALS
BODY MASS INDEX: 32.61 KG/M2 | SYSTOLIC BLOOD PRESSURE: 132 MMHG | HEIGHT: 64 IN | WEIGHT: 191 LBS | DIASTOLIC BLOOD PRESSURE: 84 MMHG

## 2021-06-11 NOTE — PROGRESS NOTES
"OFFICE VISIT NOTE    Subjective:   Chief Complaint:  Follow-up    70-year-old woman who is here for follow-up regarding some abnormalities on ultrasound that she had earlier in the year.  This was done up [facility].  She does have a thyroid ultrasound shows several small nodules.  These are most likely viral.  TSH and thyroid functions normal.  Parathyroidectomy 2006.  She is wondering if this could be a recurrence of parathyroid problems.  No good symptoms with hyperparathyroidism.    Current Outpatient Medications   Medication Sig     cholecalciferol, vitamin D3, (CHOLECALCIFEROL) 400 unit Tab Take 400 Units by mouth daily.     dorzolamide-timolol, PF, (COSOPT, PF,) 2-0.5 % Dpet ophthalmic solution Apply 1 drop to eye.     MULTIVIT-MINERALS/FERROUS FUM (MULTI VITAMIN ORAL) Take by mouth.     naproxen sodium (ALEVE) 220 MG tablet Take 220 mg by mouth at bedtime.     OMEGA-3/DHA/EPA/FISH OIL (FISH OIL-OMEGA-3 FATTY ACIDS) 300-1,000 mg capsule Take 2 g by mouth daily.     omeprazole (PRILOSEC) 20 MG capsule Take 20 mg by mouth.     tafluprost, PF, (ZIOPTAN, PF,) 0.0015 % Dpet ophthalmic dropperette Apply 1 drop to eye.       PSFHx: Tobacco Status:  She  reports that she has quit smoking. She has never used smokeless tobacco.    Review of Systems:  A comprehensive review of systems is negative except for the comments above    Objective:    Ht 5' 4\" (1.626 m)   Wt 191 lb (86.6 kg)   BMI 32.79 kg/m    GENERAL: No acute distress.  Blood pressure 130/84.  Pulse 76.  Weight is up 15 pounds.  No masses within the neck.  I certainly feel any suspicious thyroid nodules.  It is overall tiny lumpy bumpy areas.  No discrete mass.  No adenopathy.  Heart shows a regular rhythm without murmur gallop or rub    Assessment & Plan   Anahy Villegas is a 70 y.o. female.    Probable multi-thyroid nodules.  These are almost always benign.  We will check a calcium phosphorus to make sure there is not a recurrence of " hyperparathyroidism.  I think she should just repeat the ultrasound in 1 year.  Dieting and weight loss discussed.  She is overweight.    Diagnoses and all orders for this visit:    Thyroid nodule    Mixed hyperlipidemia    H/O hyperparathyroidism  -     Calcium  -     Phosphorus        The following high BMI interventions were performed this visit: encouragement to exercise    Gregg Chu MD  Transcription using voice recognition software, may contain typographical errors.

## 2021-06-12 NOTE — TELEPHONE ENCOUNTER
Test Results  Who is calling?:  Patient   Who ordered the test:  Gregg Chu MD  Type of test: Lab  Date of test:  9/22/20  Where was the test performed:  [facility]   What are your questions/concerns?:  Patient requested for results . Writer relayed providers message below . Patient stated that she moved requested to mail lab results to new address . [address] [place] [address]  Okay to leave a detailed message?:  No  Jyoti, recent labs show a normal calcium and phosphorus level.  Certainly, no evidence of hyperparathyroidism.  I think you should repeat the thyroid ultrasound in 1 year.     Please call with questions or contact us using uromoviet.     Sincerely,          Electronically signed by Gregg Chu MD

## 2021-06-12 NOTE — PROGRESS NOTES
Preoperative Consultation   Anahy Villegas   66 y.o.  female for preop exam.    Date of visit: 8/2/2017  Physician: Gregg Chu MD    This is a preoperative consultation requested by Dr. Cade in preparation for right trabeculectomy on August 29, 2017 at [facility], fax [phone].       Assessment and Plan   Anahy Villegas was seen in preoperative consultation in preparation for right trabeculectomy..  This is a low risk surgery and the patient has no increased risk for major cardiac complications. Please note patient has had no cardiopulmonary symptoms.  There has been no infections.  I believe she is ready for anesthesia and surgery as planned.    1. Preop exam for internal medicine    2. Candidiasis    3. 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     History of Present Illness   This 66 y.o. old female is in for preop exam.  She has glaucoma in the right eye and requires trabeculectomy.    Recent antiplatelet use: no  Personal or family history of bleeding or clotting disorders: no  Steroid use in the past year: no  Personal or family history of difficulty with anesthesia: no  Current cardiopulmonary symptoms: no  Last Menstrual Period: years    Review of Systems: A comprehensive review of systems was negative except as noted.     Medications and Allergies   Current Outpatient Prescriptions   Medication Sig Dispense Refill     cholecalciferol, vitamin D3, (CHOLECALCIFEROL) 400 unit Tab Take 400 Units by mouth daily.       esomeprazole (NEXIUM) 40 MG capsule Take 40 mg by mouth daily before  breakfast.       MULTIVIT-MINERALS/FERROUS FUM (MULTI VITAMIN ORAL) Take by mouth.       naproxen sodium (ALEVE) 220 MG tablet Take 220 mg by mouth at bedtime.       OMEGA-3/DHA/EPA/FISH OIL (FISH OIL-OMEGA-3 FATTY ACIDS) 300-1,000 mg capsule Take 2 g by mouth daily.       triamcinolone (KENALOG) 0.5 % cream Apply to rash twice daily 60 g 1     No current facility-administered medications for this visit.      Allergies   Allergen Reactions     Cephalexin      Penicillins      Sulfa (Sulfonamide Antibiotics)      Tetracycline         Family and Social History   Family History   Problem Relation Age of Onset     Heart failure Mother      passed age 87     Pneumonia Father      passed age 88 from complications        Social History   Substance Use Topics     Smoking status: Former Smoker     Smokeless tobacco: Never Used      Comment: quit age 52     Alcohol use Not on file        Physical Exam   General Appearance:   The appearing female.  Blood pressure 138/86.    There were no vitals taken for this visit.  Eyes-see MD note  HEAD, EARS, NOSE, MOUTH, AND THROAT: Head and face were normal. Hearing was normal to voice and the ears were normal to external exam. Nose appearance was normal and there was no discharge. Oropharynx was normal.  NECK: Neck appearance was normal. There were no neck masses and the thyroid was not enlarged.  Surgical scar from previous parathyroid surgery.  RESPIRATORY: Breathing pattern was normal and the chest moved symmetrically.  Percussion/auscultatory percussion was normal.  Lung sounds were normal and there were no abnormal sounds.  CARDIOVASCULAR: Heart rate and rhythm were normal.  S1 and S2 were normal and there were no extra sounds or murmurs. Peripheral pulses in arms and legs were normal.  Jugular venous pressure was normal.  There was no peripheral edema.  GASTROINTESTINAL: The abdomen was normal in contour.  Bowel sounds were present.  Percussion detected no organ enlargement or  tenderness.  Palpation detected no tenderness, mass, or enlarged organs.   MUSCULOSKELETAL: Skeletal configuration was normal and muscle mass was normal for age. Joint appearance was overall normal.  LYMPHATIC: There were no enlarged nodes.  SKIN/HAIR/NAILS: Skin color was normal.  There were no skin lesions.  Hair and nails were normal.  NEUROLOGIC: The patient was alert and oriented to person, place, time, and circumstance. Speech was normal. Cranial nerves were normal. Motor strength was normal for age. The patient was normally coordinated.  PSYCHIATRIC:  Mood and affect were normal and the patient had normal recent and remote memory. The patient's judgment and insight were normal.    ADDITIONAL VITAL SIGNS: Pulse 68 and regular  CHEST WALL/BREASTS: Normal female  RECTAL: Not checked  GENITAL/URINARY: Not checked     Additional Tests   Laboratory: Hemoglobin pending    Radiology: na    Electrocardiogram: na    Total time spent with the patient today was 40 minutes of which > 50% was spent in counseling and coordination of care     Gregg Chu MD  Internal Medicine  Contact me at [phone]

## 2021-06-14 NOTE — PROGRESS NOTES
Assessment and Plan:     67-year-old woman in for annual exam.  She has a history of hyperparathyroidism status post surgery 2006 with no recurrence.  Chemistry survey checked today.  Has had mild osteopenia.  I told her to get off the medication Nexium and use Zantac as needed for her gastritis.  It should be easier on the bones.  she is in need of Prevnar vaccination today.  Past history of glaucoma but she has had bilateral trabeculectomy's and currently has normal pressures without taking eyedrops.    The patient's current medical problems were reviewed.    I have had an Advance Directives discussion with the patient.  The following health maintenance schedule was reviewed with the patient and provided in printed form in the after visit summary:   Health Maintenance   Topic Date Due     ZOSTER VACCINE  08/22/2012     INFLUENZA VACCINE RULE BASED (1) 08/01/2017     DXA SCAN  08/22/2017     PNEUMOCOCCAL POLYSACCHARIDE VACCINE AGE 65 AND OVER  08/22/2017     PNEUMOCOCCAL CONJUGATE VACCINE FOR ADULTS (PCV13 OR PREVNAR)  08/22/2017     FALL RISK ASSESSMENT  08/22/2017     MAMMOGRAM  05/22/2019     ADVANCE DIRECTIVES DISCUSSED WITH PATIENT  09/26/2021     COLONOSCOPY  12/02/2021     TD 18+ HE  02/10/2024     TDAP ADULT ONE TIME DOSE  Completed        Subjective:   Chief Complaint: Anahy Villegas is an 67 y.o. female here for a Welcome to Medicare visit.   HPI: 67-year-old woman for annual welcome to Medicare.  She remains active though she is semiretired.  No cardiovascular or pulmonary problems.  She rarely gets sick.  Is pleased by her health.  No cardiovascular symptoms such as chest pain shortness of breath  No coughing.  Occasionally uses albuterol when she wheezes after a cough or cold.  No change in bowel habits.  No dysuria hematuria  No tremor.  No migraine.  No epilepsy.  No TIA-like symptoms  Minor knee arthritis.  Otherwise no musculoskeletal complaints.    Review of Systems:    Please see above.  The  rest of the review of systems are negative for all systems.    Patient Care Team:  Gregg Chu MD as PCP - General (Internal Medicine)     Patient Active Problem List   Diagnosis     Glaucoma     H/O hyperparathyroidism     Osteopenia of multiple sites     Past Medical History:   Diagnosis Date     Glaucoma      H/O hyperparathyroidism     treated surgically in 2006     Meniscus degeneration     knee     Panic attack       Past Surgical History:   Procedure Laterality Date     APPENDECTOMY       BLADDER REPAIR       HERNIA REPAIR      left abdominal wall     HYSTERECTOMY      prolapse     PARATHYROID GLAND SURGERY      adenoma removed in 2006     PATELLA ARTHROPLASTY       WRIST FRACTURE SURGERY Left       Family History   Problem Relation Age of Onset     Heart failure Mother      passed age 87     Pneumonia Father      passed age 88 from complications      Social History     Social History     Marital status:      Spouse name: N/A     Number of children: N/A     Years of education: N/A     Occupational History     Not on file.     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       esomeprazole (NEXIUM) 40 MG capsule Take 40 mg by mouth daily before breakfast.       MULTIVIT-MINERALS/FERROUS FUM (MULTI VITAMIN ORAL) Take by mouth.       naproxen sodium (ALEVE) 220 MG tablet Take 220 mg by mouth at bedtime.       OMEGA-3/DHA/EPA/FISH OIL (FISH OIL-OMEGA-3 FATTY ACIDS) 300-1,000 mg capsule Take 2 g by mouth daily.       nystatin (MYCOSTATIN) ointment Apply to area daily as needed 30 g 1      "triamcinolone (KENALOG) 0.5 % cream Apply to rash twice daily 60 g 1     No current facility-administered medications for this visit.       Objective:   Vital Signs:   Visit Vitals     Pulse 73     Ht 5' 4\" (1.626 m)     Wt 189 lb (85.7 kg)     SpO2 97%     BMI 32.44 kg/m2        EKG:      VisionScreening:  No exam data present vision 20-25 by my exam    PHYSICAL EXAM  Blood pressure 126/80.  Pulse 70 and regular.  EYES: Eyelids, conjunctiva, and sclera were normal. Pupils were normal. Cornea, iris, and lens were normal bilaterally.  HEAD, EARS, NOSE, MOUTH, AND THROAT: Head and face were normal. Hearing was normal to voice and the ears were normal to external exam. Nose appearance was normal and there was no discharge. Oropharynx was normal.  NECK: Neck appearance was normal. There were no neck masses and the thyroid was not enlarged.  Scar from previous parathyroid surgery.  RESPIRATORY: Breathing pattern was normal and the chest moved symmetrically.  Percussion/auscultatory percussion was normal.  Lung sounds were normal and there were no abnormal sounds.  CARDIOVASCULAR: Heart rate and rhythm were normal.  S1 and S2 were normal and there were no extra sounds or murmurs. Peripheral pulses in arms and legs were normal.  Jugular venous pressure was normal.  There was no peripheral edema.  GASTROINTESTINAL: The abdomen was normal in contour.  Bowel sounds were present.  Percussion detected no organ enlargement or tenderness.  Palpation detected no tenderness, mass, or enlarged organs.   MUSCULOSKELETAL: Skeletal configuration was normal and muscle mass was normal for age. Joint appearance was overall normal.  Some crepitus of the right knee on flexion.  LYMPHATIC: There were no enlarged nodes.  SKIN/HAIR/NAILS: Skin color was normal.  There were no skin lesions.  Hair and nails were normal.  NEUROLOGIC: The patient was alert and oriented to person, place, time, and circumstance. Speech was normal. Cranial nerves " were normal. Motor strength was normal for age. The patient was normally coordinated. S He gives an excellent history.  I think memory is completely normal for her.  PSYCHIATRIC:  Mood and affect were normal and the patient had normal recent and remote memory. The patient's judgment and insight were normal.    ADDITIONAL VITAL SIGNS: Pulse 70  CHEST WALL/BREASTS: Normal female.  No masses.  RECTAL: Not checked  GENITAL/URINARY: Hysterectomy in the past        Assessment Results 11/13/2017   Activities of Daily Living No help needed   Instrumental Activities of Daily Living No help needed   Get Up and Go Score Less than 12 seconds   Mini Cog Total Score 4   Some recent data might be hidden     A Mini Cog score of 0-2 suggests the possibility of dementia, score of 3-5 suggests no dementia    Identified Health Risks:     Information regarding advance directives (living jacobsen), including where she can download the appropriate form, was provided to the patient via the AVS.

## 2021-06-16 NOTE — PROGRESS NOTES
"OFFICE VISIT NOTE    Subjective:   Chief Complaint:  Follow-up    The 7-year-old woman is in for several problems most significant which is depression following the sudden death of her  2 weeks ago.  He collapsed in the home and  of cardiac arrest.  She attempted resuscitation.  Prior to that, she been fighting a respiratory infection and had been treated with Levaquin as well as albuterol inhalers.  She is still has a tight cough.  No wheezing or shortness of breath.    Current Outpatient Prescriptions   Medication Sig     cholecalciferol, vitamin D3, (CHOLECALCIFEROL) 400 unit Tab Take 400 Units by mouth daily.     LORazepam (ATIVAN) 1 MG tablet Take 0.5-1 mg by mouth.     MULTIVIT-MINERALS/FERROUS FUM (MULTI VITAMIN ORAL) Take by mouth.     naproxen sodium (ALEVE) 220 MG tablet Take 220 mg by mouth at bedtime.     OMEGA-3/DHA/EPA/FISH OIL (FISH OIL-OMEGA-3 FATTY ACIDS) 300-1,000 mg capsule Take 2 g by mouth daily.     ranitidine (ZANTAC) 150 MG tablet Take 150 mg by mouth.     VENTOLIN HFA 90 mcg/actuation inhaler      clonazePAM (KLONOPIN) 0.5 MG tablet Take 1 tablet (0.5 mg total) by mouth at bedtime as needed for anxiety.     nystatin (MYCOSTATIN) ointment Apply to area daily as needed     triamcinolone (KENALOG) 0.5 % cream Apply to rash twice daily       PSFHx: Tobacco Status:  She  reports that she has quit smoking. She has never used smokeless tobacco.    Review of Systems:  A comprehensive review of systems is negative except for the comments above    Objective:    Pulse 86  Ht 5' 4\" (1.626 m)  Wt 189 lb (85.7 kg)  SpO2 98%  BMI 32.44 kg/m2  GENERAL: No acute distress.  She is in no distress.  Blood pressure borderline elevated to 136/90.  Pulse 80 and regular.  Oxygen saturation 90%.  ENT exam normal.  Heart shows regular rhythm without murmur gallop or rub.  Lungs are free of any rales wheezes or rhonchi.  No stridor.  No adenopathy in the neck.  No peripheral edema or cyanosis.  Mentally " she is sharp and alert.  She is clearly depressed but certainly not psychotic.  She answers questions appropriately.  She shows good insight.    Assessment & Plan   Anahy Villegas is a 67 y.o. female.    Reactive depression.  She is having terrible insomnia so we will allow her take clonazepam 0.5 mg at at bedtime.  30 tablets were prescribed.  She can continue using albuterol inhaler as needed.  No further need for antibiotics here.  We spent 40 minutes talking about depression expectations etc.  Most of the time with her was spent counseling.    Diagnoses and all orders for this visit:    Reactive depression  -     clonazePAM (KLONOPIN) 0.5 MG tablet; Take 1 tablet (0.5 mg total) by mouth at bedtime as needed for anxiety.  Dispense: 30 tablet; Refill: 0        The following high BMI interventions were performed this visit: encouragement to exercise    Gregg Chu MD  Transcription using voice recognition software, may contain typographical errors.

## 2021-06-18 NOTE — PROGRESS NOTES
OFFICE VISIT NOTE    Subjective:   Chief Complaint:  Follow-up (cholesterol and numbness in left arm)    67-year-old woman who is in to have follow-up cholesterol levels checked.  She has been dieting is lost 10 pounds.  She still grieving over the loss of her   a few months ago.  The new recent problem with some tingling she feels in the left forearm.  She thinks sometimes it occurs if she turns her head to the left where she is holding her phone in the left hand looking down.  There is no associated pain only a tingling in the forearm.  No rash ever seen in the area.    Current Outpatient Prescriptions   Medication Sig     cholecalciferol, vitamin D3, (CHOLECALCIFEROL) 400 unit Tab Take 400 Units by mouth daily.     clonazePAM (KLONOPIN) 0.5 MG tablet Take 1 tablet (0.5 mg total) by mouth at bedtime as needed for anxiety.     LORazepam (ATIVAN) 1 MG tablet Take 0.5-1 mg by mouth.     MULTIVIT-MINERALS/FERROUS FUM (MULTI VITAMIN ORAL) Take by mouth.     naproxen sodium (ALEVE) 220 MG tablet Take 220 mg by mouth at bedtime.     nystatin (MYCOSTATIN) ointment Apply to area daily as needed     OMEGA-3/DHA/EPA/FISH OIL (FISH OIL-OMEGA-3 FATTY ACIDS) 300-1,000 mg capsule Take 2 g by mouth daily.     ranitidine (ZANTAC) 150 MG tablet Take 150 mg by mouth.     triamcinolone (KENALOG) 0.5 % cream Apply to rash twice daily     VENTOLIN HFA 90 mcg/actuation inhaler        PSFHx: Tobacco Status:  She  reports that she has quit smoking. She has never used smokeless tobacco.    Review of Systems:  A comprehensive review of systems is negative except for the comments above    Objective:    There were no vitals taken for this visit.  GENERAL: No acute distress.  Blood pressure 142/82.  Pulse 76.  Handgrip is excellent.  Circulation in the left arm and hand is normal.  Reflexes are normal.  Handgrip is normal.  Monofilament fiber sensation is normal in both hands.  There is no atrophy.  She is sitting in a chair and she  tilts her head to the left and upward she does have some tingling felt in the left forearm within 20-30 seconds.  It does disappear when she brings her head back to neutral.  Tinel's sign is negative.    Assessment & Plan   Anahy Villegas is a 67 y.o. female.    Lipidemia.  Check lipid profile today.  Tingling and paresthesias of the left forearm.  This could be a cervical radiculopathy or nerve entrapment.  Right now will wait and see if this disappears.  If it becomes more prominent or there is any pain involved, I would do an MRI of the cervical spine.  She is not having pain so she is not interested in any surgery etc. in the neck, if that were indicated.    Diagnoses and all orders for this visit:    Mixed hyperlipidemia  -     Lipid Cascade    Cervical radiculopathy        The following high BMI interventions were performed this visit: encouragement to exercise    Gregg Chu MD  Transcription using voice recognition software, may contain typographical errors.

## 2021-06-20 NOTE — LETTER
Letter by Gregg Chu MD at      Author: Gregg Chu MD Service: -- Author Type: --    Filed:  Encounter Date: 9/24/2020 Status: (Other)         Anahy Villegas  [id number] [facility] [address]             September 24, 2020         Dear Ms. Villegas,    Below are the results from your recent visit:    Resulted Orders   Calcium   Result Value Ref Range    Calcium 10.0 8.5 - 10.5 mg/dL   Phosphorus   Result Value Ref Range    Phosphorus 3.2 2.5 - 4.5 mg/dL       Jyoti, recent labs show a normal calcium and phosphorus level.  Certainly, no evidence of hyperparathyroidism.  I think you should repeat the thyroid ultrasound in 1 year.    Please call with questions or contact us using [employer].    Sincerely,        Electronically signed by Gregg Chu MD

## 2021-06-22 NOTE — PROGRESS NOTES
Assessment and Plan:   68-year-old woman for annual wellness visit    1. Routine general medical examination at a health care facility  Doing fairly well.  She lost her  this past year so she is still grieving.  - Thyroid [place]  Due for Pneumovax.  - Urinalysis-UC if Indicated    2. Mixed hyperlipidemia  No angina TIAs or claudication  - Lipid Cascade    3. Osteopenia of multiple sites  Minor low back pain.  No fracture    4. H/O hyperparathyroidism  2006 she had a parathyroid adenoma removal.    5. Medication management  No trouble with medications.  She is taking Aleve twice daily for back pain.  - HM1(CBC and Differential)  - Comprehensive Metabolic Panel  - HM1 (CBC with Diff)        The patient's current medical problems were reviewed.    I have had an Advance Directives discussion with the patient.  The following health maintenance schedule was reviewed with the patient and provided in printed form in the after visit summary:   Health Maintenance   Topic Date Due     DEPRESSION FOLLOW UP  1950     ZOSTER VACCINES (1 of 2) 08/22/2002     DXA SCAN  08/22/2017     PNEUMOCOCCAL POLYSACCHARIDE VACCINE AGE 65 AND OVER  08/22/2017     INFLUENZA VACCINE RULE BASED (1) 08/01/2018     FALL RISK ASSESSMENT  03/19/2019     MAMMOGRAM  06/14/2020     COLONOSCOPY  12/02/2021     ADVANCE DIRECTIVES DISCUSSED WITH PATIENT  11/13/2022     TD 18+ HE  02/10/2024     PNEUMOCOCCAL CONJUGATE VACCINE FOR ADULTS (PCV13 OR PREVNAR)  Completed        Subjective:   Chief Complaint: Anahy Villegas is an 68 y.o. female here for an Annual Wellness visit.   HPI: 68-year-old woman in for annual wellness visit.  Except for some mood issues following her 's death, she has been physically stable.  No chest pain or any orthopnea  No cough wheezing or asthma  No dysphasia.  No chronic abdominal pain.  No melena.  No dysuria hematuria.  No kidney stones.  No diabetes.  No epilepsy.  No TIAs.  No tremor.  No neuropathy.  No  significant musculoskeletal complaints.    Review of Systems:    Please see above.  The rest of the review of systems are negative for all systems.    Patient Care Team:  Gregg Chu MD as PCP - General (Internal Medicine)  Annamarie Cade MD (Ophthalmology)     Patient Active Problem List   Diagnosis     Glaucoma     H/O hyperparathyroidism     Osteopenia of multiple sites     Mixed hyperlipidemia     Cervical radiculopathy     Past Medical History:   Diagnosis Date     Glaucoma      H/O hyperparathyroidism     treated surgically in 2006     Meniscus degeneration     knee     Panic attack       Past Surgical History:   Procedure Laterality Date     APPENDECTOMY       BLADDER REPAIR       HERNIA REPAIR      left abdominal wall     HYSTERECTOMY      prolapse     PARATHYROID GLAND SURGERY      adenoma removed in 2006     PATELLA ARTHROPLASTY       WRIST FRACTURE SURGERY Left       Family History   Problem Relation Age of Onset     Heart failure Mother         passed age 87     Pneumonia Father         passed age 88 from complications      Social History     Socioeconomic History     Marital status:      Spouse name: Not on file     Number of children: Not on file     Years of education: Not on file     Highest education level: Not on file   Social Needs     Financial resource strain: Not on file     Food insecurity - worry: Not on file     Food insecurity - inability: Not on file     Transportation needs - medical: Not on file     Transportation needs - non-medical: Not on file   Occupational History     Not on file   Tobacco Use     Smoking status: Former Smoker     Smokeless tobacco: Never Used     Tobacco comment: quit age 52   Substance and Sexual Activity     Alcohol use: Not on file     Drug use: Not on file     Sexual activity: Not on file   Other Topics Concern     Not on file   Social History Narrative     , 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       MULTIVIT-MINERALS/FERROUS FUM (MULTI VITAMIN ORAL) Take by mouth.       naproxen sodium (ALEVE) 220 MG tablet Take 220 mg by mouth at bedtime.       OMEGA-3/DHA/EPA/FISH OIL (FISH OIL-OMEGA-3 FATTY ACIDS) 300-1,000 mg capsule Take 2 g by mouth daily.       ranitidine (ZANTAC) 150 MG tablet Take 150 mg by mouth.       triamcinolone (KENALOG) 0.5 % cream Apply to rash twice daily (Patient taking differently: Apply to rash twice daily PRN.      ) 60 g 1     nystatin (MYCOSTATIN) ointment Apply to area daily as needed 30 g 1     VENTOLIN HFA 90 mcg/actuation inhaler   0     No current facility-administered medications for this visit.       Objective:   Vital Signs:   Visit Vitals  Pulse 68   Ht 5' 4\" (1.626 m)   Wt 174 lb (78.9 kg)   SpO2 97%   BMI 29.87 kg/m         VisionScreening:  No exam data present     PHYSICAL EXAM  Is a pleasant woman who is somewhat teary eyed when she talks about her past year.  Her  .  EYES: Eyelids, conjunctiva, and sclera were normal. Pupils were normal. Cornea, iris, and lens were normal bilaterally.  HEAD, EARS, NOSE, MOUTH, AND THROAT: Head and face were normal. Hearing was normal to voice and the ears were normal to external exam. Nose appearance was normal and there was no discharge. Oropharynx was normal.  NECK: Neck appearance was normal. There were no neck masses and the thyroid was not enlarged.  Scar in the anterior neck from previous parathyroid neck surgery.  RESPIRATORY: Breathing pattern was normal and the chest moved symmetrically.  Percussion/auscultatory percussion was normal.  Lung sounds were normal and there were no abnormal sounds.  CARDIOVASCULAR: Heart rate and rhythm were normal.  S1 and S2 were normal and there were no extra sounds or murmurs. Peripheral pulses in arms and legs were normal.  Jugular venous pressure was normal.  There was no peripheral " edema.  GASTROINTESTINAL: The abdomen was normal in contour.  Bowel sounds were present.  Percussion detected no organ enlargement or tenderness.  Palpation detected no tenderness, mass, or enlarged organs.   MUSCULOSKELETAL: Skeletal configuration was normal and muscle mass was normal for age. Joint appearance was overall normal.  Right knee patelloplasty in the past.  LYMPHATIC: There were no enlarged nodes.  SKIN/HAIR/NAILS: Skin color was normal.  There were no skin lesions.  Hair and nails were normal.  NEUROLOGIC: The patient was alert and oriented to person, place, time, and circumstance. Speech was normal. Cranial nerves were normal. Motor strength was normal for age. The patient was normally coordinated.  PSYCHIATRIC:  Mood and affect were normal and the patient had normal recent and remote memory. The patient's judgment and insight were normal.    ADDITIONAL VITAL SIGNS: Pulse 76  CHEST WALL/BREASTS: Normal female no masses  RECTAL: Not checked  GENITAL/URINARY: Hysterectomy in the past        Assessment Results 11/13/2017   Activities of Daily Living No help needed   Instrumental Activities of Daily Living No help needed   Get Up and Go Score Less than 12 seconds   Mini Cog Total Score 4   Some recent data might be hidden     A Mini-Cog score of 0-2 suggests the possibility of dementia, score of 3-5 suggests no dementia    Identified Health Risks:     Information regarding advance directives (living [email or web address]), including where she can download the appropriate form, was provided to the patient via the AVS.

## 2021-09-19 ENCOUNTER — HEALTH MAINTENANCE LETTER (OUTPATIENT)
Age: 71
End: 2021-09-19

## 2021-11-13 ENCOUNTER — HEALTH MAINTENANCE LETTER (OUTPATIENT)
Age: 71
End: 2021-11-13

## 2022-04-30 ENCOUNTER — HEALTH MAINTENANCE LETTER (OUTPATIENT)
Age: 72
End: 2022-04-30

## 2022-11-20 ENCOUNTER — HEALTH MAINTENANCE LETTER (OUTPATIENT)
Age: 72
End: 2022-11-20

## 2023-06-02 ENCOUNTER — HEALTH MAINTENANCE LETTER (OUTPATIENT)
Age: 73
End: 2023-06-02

## 2023-11-25 ENCOUNTER — HEALTH MAINTENANCE LETTER (OUTPATIENT)
Age: 73
End: 2023-11-25